# Patient Record
Sex: FEMALE | Race: WHITE | URBAN - METROPOLITAN AREA
[De-identification: names, ages, dates, MRNs, and addresses within clinical notes are randomized per-mention and may not be internally consistent; named-entity substitution may affect disease eponyms.]

---

## 2020-07-08 ENCOUNTER — OFFICE VISIT (OUTPATIENT)
Dept: URBAN - METROPOLITAN AREA CLINIC 98 | Facility: CLINIC | Age: 50
End: 2020-07-08

## 2020-08-03 ENCOUNTER — OFFICE VISIT (OUTPATIENT)
Dept: URBAN - METROPOLITAN AREA CLINIC 98 | Facility: CLINIC | Age: 50
End: 2020-08-03

## 2020-08-31 ENCOUNTER — OFFICE VISIT (OUTPATIENT)
Dept: URBAN - METROPOLITAN AREA CLINIC 98 | Facility: CLINIC | Age: 50
End: 2020-08-31

## 2020-09-23 ENCOUNTER — OFFICE VISIT (OUTPATIENT)
Dept: URBAN - METROPOLITAN AREA CLINIC 98 | Facility: CLINIC | Age: 50
End: 2020-09-23

## 2020-10-21 ENCOUNTER — OFFICE VISIT (OUTPATIENT)
Dept: URBAN - METROPOLITAN AREA CLINIC 98 | Facility: CLINIC | Age: 50
End: 2020-10-21

## 2020-11-18 ENCOUNTER — OFFICE VISIT (OUTPATIENT)
Dept: URBAN - METROPOLITAN AREA CLINIC 98 | Facility: CLINIC | Age: 50
End: 2020-11-18

## 2020-12-16 ENCOUNTER — OFFICE VISIT (OUTPATIENT)
Dept: URBAN - METROPOLITAN AREA CLINIC 98 | Facility: CLINIC | Age: 50
End: 2020-12-16

## 2021-01-13 ENCOUNTER — OFFICE VISIT (OUTPATIENT)
Dept: URBAN - METROPOLITAN AREA CLINIC 98 | Facility: CLINIC | Age: 51
End: 2021-01-13

## 2021-02-11 ENCOUNTER — OFFICE VISIT (OUTPATIENT)
Dept: URBAN - METROPOLITAN AREA CLINIC 98 | Facility: CLINIC | Age: 51
End: 2021-02-11

## 2021-05-06 ENCOUNTER — OFFICE VISIT (OUTPATIENT)
Dept: URBAN - METROPOLITAN AREA CLINIC 98 | Facility: CLINIC | Age: 51
End: 2021-05-06

## 2021-06-02 ENCOUNTER — OFFICE VISIT (OUTPATIENT)
Dept: URBAN - METROPOLITAN AREA CLINIC 98 | Facility: CLINIC | Age: 51
End: 2021-06-02

## 2021-07-01 ENCOUNTER — OFFICE VISIT (OUTPATIENT)
Dept: URBAN - METROPOLITAN AREA CLINIC 98 | Facility: CLINIC | Age: 51
End: 2021-07-01

## 2021-07-28 ENCOUNTER — OFFICE VISIT (OUTPATIENT)
Dept: URBAN - METROPOLITAN AREA CLINIC 98 | Facility: CLINIC | Age: 51
End: 2021-07-28

## 2021-08-30 ENCOUNTER — OFFICE VISIT (OUTPATIENT)
Dept: URBAN - METROPOLITAN AREA CLINIC 98 | Facility: CLINIC | Age: 51
End: 2021-08-30

## 2021-09-23 ENCOUNTER — OFFICE VISIT (OUTPATIENT)
Dept: URBAN - METROPOLITAN AREA CLINIC 98 | Facility: CLINIC | Age: 51
End: 2021-09-23

## 2021-10-21 ENCOUNTER — OFFICE VISIT (OUTPATIENT)
Dept: URBAN - METROPOLITAN AREA CLINIC 98 | Facility: CLINIC | Age: 51
End: 2021-10-21

## 2021-11-18 ENCOUNTER — OFFICE VISIT (OUTPATIENT)
Dept: URBAN - METROPOLITAN AREA CLINIC 98 | Facility: CLINIC | Age: 51
End: 2021-11-18

## 2021-12-20 ENCOUNTER — OFFICE VISIT (OUTPATIENT)
Dept: URBAN - METROPOLITAN AREA CLINIC 98 | Facility: CLINIC | Age: 51
End: 2021-12-20

## 2021-12-22 ENCOUNTER — TELEPHONE ENCOUNTER (OUTPATIENT)
Dept: URBAN - METROPOLITAN AREA CLINIC 98 | Facility: CLINIC | Age: 51
End: 2021-12-22

## 2021-12-22 ENCOUNTER — LAB OUTSIDE AN ENCOUNTER (OUTPATIENT)
Dept: URBAN - METROPOLITAN AREA CLINIC 98 | Facility: CLINIC | Age: 51
End: 2021-12-22

## 2022-01-13 ENCOUNTER — OFFICE VISIT (OUTPATIENT)
Dept: URBAN - METROPOLITAN AREA CLINIC 98 | Facility: CLINIC | Age: 52
End: 2022-01-13

## 2022-01-26 ENCOUNTER — OFFICE VISIT (OUTPATIENT)
Dept: URBAN - METROPOLITAN AREA CLINIC 98 | Facility: CLINIC | Age: 52
End: 2022-01-26

## 2022-02-16 ENCOUNTER — OFFICE VISIT (OUTPATIENT)
Dept: URBAN - METROPOLITAN AREA CLINIC 98 | Facility: CLINIC | Age: 52
End: 2022-02-16

## 2022-02-24 ENCOUNTER — OFFICE VISIT (OUTPATIENT)
Dept: URBAN - METROPOLITAN AREA CLINIC 98 | Facility: CLINIC | Age: 52
End: 2022-02-24

## 2022-03-08 ENCOUNTER — OFFICE VISIT (OUTPATIENT)
Dept: URBAN - METROPOLITAN AREA SURGERY CENTER 18 | Facility: SURGERY CENTER | Age: 52
End: 2022-03-08
Payer: COMMERCIAL

## 2022-03-08 ENCOUNTER — CLAIMS CREATED FROM THE CLAIM WINDOW (OUTPATIENT)
Dept: URBAN - METROPOLITAN AREA CLINIC 4 | Facility: CLINIC | Age: 52
End: 2022-03-08
Payer: COMMERCIAL

## 2022-03-08 DIAGNOSIS — K51.80 OTHER ULCERATIVE COLITIS WITHOUT COMPLICATIONS: ICD-10-CM

## 2022-03-08 DIAGNOSIS — K51.00 ACUTE ULCERATIVE PANCOLITIS: ICD-10-CM

## 2022-03-08 DIAGNOSIS — K63.89 GASEOUS DISTENTION OF INTESTINE DETERMINED BY X-RAY: ICD-10-CM

## 2022-03-08 DIAGNOSIS — Z12.11 AVERAGE RISK FOR CRC. DUE TO PT'S CO-MORBID STATE WITH END STAGE DEMENTIA, HIGH RISK FOR ANESTHESIA (PER NEUROLOGY); INABILITY TO TAKE A BOWEL PREP....WOULD NOT ADVISE ANY COLORECTAL CANCER SCREENING INCLUDING STOOL TEST FOR FECAL BLOOD.: ICD-10-CM

## 2022-03-08 PROCEDURE — 992 NON-BILLABLE: Performed by: INTERNAL MEDICINE

## 2022-03-08 PROCEDURE — G8907 PT DOC NO EVENTS ON DISCHARG: HCPCS | Performed by: INTERNAL MEDICINE

## 2022-03-08 PROCEDURE — 45380 COLONOSCOPY AND BIOPSY: CPT | Performed by: INTERNAL MEDICINE

## 2022-03-08 PROCEDURE — 88305 TISSUE EXAM BY PATHOLOGIST: CPT | Performed by: PATHOLOGY

## 2022-03-10 ENCOUNTER — OFFICE VISIT (OUTPATIENT)
Dept: URBAN - METROPOLITAN AREA CLINIC 98 | Facility: CLINIC | Age: 52
End: 2022-03-10

## 2022-06-02 ENCOUNTER — OFFICE VISIT (OUTPATIENT)
Dept: URBAN - METROPOLITAN AREA CLINIC 98 | Facility: CLINIC | Age: 52
End: 2022-06-02

## 2022-06-16 ENCOUNTER — OFFICE VISIT (OUTPATIENT)
Dept: URBAN - METROPOLITAN AREA CLINIC 98 | Facility: CLINIC | Age: 52
End: 2022-06-16

## 2022-07-18 ENCOUNTER — TELEPHONE ENCOUNTER (OUTPATIENT)
Dept: URBAN - METROPOLITAN AREA CLINIC 98 | Facility: CLINIC | Age: 52
End: 2022-07-18

## 2022-07-18 ENCOUNTER — OFFICE VISIT (OUTPATIENT)
Dept: URBAN - METROPOLITAN AREA CLINIC 98 | Facility: CLINIC | Age: 52
End: 2022-07-18
Payer: COMMERCIAL

## 2022-07-18 ENCOUNTER — WEB ENCOUNTER (OUTPATIENT)
Dept: URBAN - METROPOLITAN AREA CLINIC 98 | Facility: CLINIC | Age: 52
End: 2022-07-18

## 2022-07-18 VITALS
HEIGHT: 65 IN | HEART RATE: 70 BPM | TEMPERATURE: 97.9 F | DIASTOLIC BLOOD PRESSURE: 75 MMHG | SYSTOLIC BLOOD PRESSURE: 117 MMHG | WEIGHT: 198 LBS | BODY MASS INDEX: 32.99 KG/M2

## 2022-07-18 DIAGNOSIS — K62.5 RECTAL BLEEDING: ICD-10-CM

## 2022-07-18 DIAGNOSIS — M54.50 LOW BACK PAIN, UNSPECIFIED: ICD-10-CM

## 2022-07-18 DIAGNOSIS — G89.29 OTHER CHRONIC PAIN: ICD-10-CM

## 2022-07-18 DIAGNOSIS — K51.00 CHRONIC ULCERATIVE ENTEROCOLITIS WITHOUT COMPLICATION: ICD-10-CM

## 2022-07-18 DIAGNOSIS — R10.9 ABDOMINAL PAIN, UNSPECIFIED ABDOMINAL LOCATION: ICD-10-CM

## 2022-07-18 DIAGNOSIS — R19.7 DIARRHEA, UNSPECIFIED TYPE: ICD-10-CM

## 2022-07-18 PROCEDURE — 99215 OFFICE O/P EST HI 40 MIN: CPT | Performed by: INTERNAL MEDICINE

## 2022-07-18 RX ORDER — BUDESONIDE 9 MG/1
1 TABLET IN THE MORNING TABLET, EXTENDED RELEASE ORAL ONCE A DAY
Qty: 30 TABLETS | Refills: 1 | OUTPATIENT
Start: 2022-07-18 | End: 2022-09-16

## 2022-07-18 RX ORDER — VEDOLIZUMAB 300 MG/5ML
AS DIRECTED INJECTION, POWDER, LYOPHILIZED, FOR SOLUTION INTRAVENOUS
Qty: 300 MILLIGRAMS | Refills: 0 | OUTPATIENT
Start: 2022-07-18 | End: 2022-10-16

## 2022-07-18 NOTE — HPI-TODAY'S VISIT:
52 yo female recently completed the etrolizumab study which ended.  Summer was on this study since 2015 and did fabulous.  The study was prematurely ended due to ????efficacy but it was highly efficacious for her.  Last dose of etrolizumab was March-April and she was asyx then.  Syx recurred in June, 1-2 months after last dose.  Had one episode of flare with diarrhea. She has a complete change in BM and the size of stool. More blood. Stool or tissue or mucous.  Little abdominal pain. Is having low back pain. At initial dx, [id number], had syx like this and worse.  Sulfa allergic and dose not tolerate mesalamine. These agents worsen her UC.  No other medical illnesses.  No recent antibiotics and no h/o C diff.  Probiotics do not help. Tumeric in past did not help.  Tried steroid foam that was a temporary fix. Does not think she can wait a month for a study.   Past treatment at dx 2012 included mesalamine that made her worse - sulfa allergy: asacol. Prednisone: effective but with side effects-- fatigue Past Remicade --- anaphylactic rx with third dose ---- underdosed.  Humira - no benefit Simponi - no benefit

## 2022-07-21 ENCOUNTER — TELEPHONE ENCOUNTER (OUTPATIENT)
Dept: URBAN - METROPOLITAN AREA CLINIC 98 | Facility: CLINIC | Age: 52
End: 2022-07-21

## 2022-07-21 LAB
A/G RATIO: 2
ALBUMIN: 4.3
ALKALINE PHOSPHATASE: 78
ALT (SGPT): 15
AST (SGOT): 15
BASO (ABSOLUTE): 0
BASOS: 1
BILIRUBIN, TOTAL: 0.6
BUN/CREATININE RATIO: 14
BUN: 13
C-REACTIVE PROTEIN, QUANT: 8
CALCIUM: 9.5
CARBON DIOXIDE, TOTAL: 25
CHLORIDE: 103
CREATININE: 0.91
EGFR: 77
EOS (ABSOLUTE): 0.2
EOS: 4
FERRITIN, SERUM: 107
FOLATE (FOLIC ACID), SERUM: 5.7
GLOBULIN, TOTAL: 2.1
GLUCOSE: 91
HBSAG SCREEN: NEGATIVE
HEMATOCRIT: 42.3
HEMATOLOGY COMMENTS:: (no result)
HEMOGLOBIN: 14.1
HEP B SURFACE AB, QUAL: REACTIVE
IMMATURE CELLS: (no result)
IMMATURE GRANS (ABS): 0
IMMATURE GRANULOCYTES: 0
IRON BIND.CAP.(TIBC): 321
IRON SATURATION: 26
IRON: 82
LYMPHS (ABSOLUTE): 1.9
LYMPHS: 30
MCH: 31
MCHC: 33.3
MCV: 93
MONOCYTES(ABSOLUTE): 0.5
MONOCYTES: 8
NEUTROPHILS (ABSOLUTE): 3.7
NEUTROPHILS: 57
NRBC: (no result)
PLATELETS: 324
POTASSIUM: 4.3
PROTEIN, TOTAL: 6.4
QUANTIFERON CRITERIA: (no result)
QUANTIFERON INCUBATION: (no result)
QUANTIFERON MITOGEN VALUE: >10
QUANTIFERON NIL VALUE: 0
QUANTIFERON TB1 AG VALUE: 0
QUANTIFERON TB2 AG VALUE: 0
QUANTIFERON-TB GOLD PLUS: NEGATIVE
RBC: 4.55
RDW: 12.4
SEDIMENTATION RATE-WESTERGREN: 4
SODIUM: 140
UIBC: 239
VITAMIN B12: 569
VITAMIN D, 25-HYDROXY: 23.4
WBC: 6.4

## 2022-07-21 RX ORDER — ERGOCALCIFEROL CAPSULES, 1.25 MG/1
1 CAPSULE CAPSULE ORAL
Qty: 12 CAPSULES | Refills: 1 | OUTPATIENT
Start: 2022-07-21 | End: 2023-01-16

## 2022-07-25 ENCOUNTER — TELEPHONE ENCOUNTER (OUTPATIENT)
Dept: URBAN - METROPOLITAN AREA CLINIC 98 | Facility: CLINIC | Age: 52
End: 2022-07-25

## 2022-07-25 ENCOUNTER — LAB OUTSIDE AN ENCOUNTER (OUTPATIENT)
Dept: URBAN - METROPOLITAN AREA CLINIC 98 | Facility: CLINIC | Age: 52
End: 2022-07-25

## 2022-07-28 LAB
C DIFFICILE TOXIN GENE NAA: NEGATIVE
C DIFFICILE TOXINS A+B, EIA: NEGATIVE
CALPROTECTIN, FECAL: 174
LACTOFERRIN, FECAL, QUANT.: 128.8

## 2022-08-03 ENCOUNTER — OFFICE VISIT (OUTPATIENT)
Dept: URBAN - METROPOLITAN AREA CLINIC 97 | Facility: CLINIC | Age: 52
End: 2022-08-03
Payer: COMMERCIAL

## 2022-08-03 VITALS
RESPIRATION RATE: 18 BRPM | WEIGHT: 197 LBS | HEIGHT: 65 IN | HEART RATE: 68 BPM | SYSTOLIC BLOOD PRESSURE: 110 MMHG | TEMPERATURE: 96.5 F | BODY MASS INDEX: 32.82 KG/M2 | DIASTOLIC BLOOD PRESSURE: 71 MMHG

## 2022-08-03 DIAGNOSIS — K51.90 ACUTE ULCERATIVE COLITIS: ICD-10-CM

## 2022-08-03 PROCEDURE — 96413 CHEMO IV INFUSION 1 HR: CPT | Performed by: INTERNAL MEDICINE

## 2022-08-03 RX ORDER — VEDOLIZUMAB 300 MG/5ML
AS DIRECTED INJECTION, POWDER, LYOPHILIZED, FOR SOLUTION INTRAVENOUS
Qty: 300 MILLIGRAMS | Refills: 0 | Status: ACTIVE | COMMUNITY
Start: 2022-07-18 | End: 2022-10-16

## 2022-08-03 RX ORDER — BUDESONIDE 9 MG/1
1 TABLET IN THE MORNING TABLET, EXTENDED RELEASE ORAL ONCE A DAY
Qty: 30 TABLETS | Refills: 1 | Status: ACTIVE | COMMUNITY
Start: 2022-07-18 | End: 2022-09-16

## 2022-08-03 RX ORDER — ERGOCALCIFEROL CAPSULES, 1.25 MG/1
1 CAPSULE CAPSULE ORAL
Qty: 12 CAPSULES | Refills: 1 | Status: ACTIVE | COMMUNITY
Start: 2022-07-21 | End: 2023-01-16

## 2022-08-12 ENCOUNTER — TELEPHONE ENCOUNTER (OUTPATIENT)
Dept: URBAN - METROPOLITAN AREA CLINIC 97 | Facility: CLINIC | Age: 52
End: 2022-08-12

## 2022-08-17 ENCOUNTER — OFFICE VISIT (OUTPATIENT)
Dept: URBAN - METROPOLITAN AREA CLINIC 97 | Facility: CLINIC | Age: 52
End: 2022-08-17
Payer: COMMERCIAL

## 2022-08-17 VITALS
HEIGHT: 65 IN | HEART RATE: 84 BPM | BODY MASS INDEX: 32.82 KG/M2 | RESPIRATION RATE: 18 BRPM | DIASTOLIC BLOOD PRESSURE: 81 MMHG | WEIGHT: 197 LBS | TEMPERATURE: 96 F | SYSTOLIC BLOOD PRESSURE: 131 MMHG

## 2022-08-17 DIAGNOSIS — K51.90 ACUTE ULCERATIVE COLITIS: ICD-10-CM

## 2022-08-17 PROCEDURE — 96413 CHEMO IV INFUSION 1 HR: CPT | Performed by: INTERNAL MEDICINE

## 2022-08-17 RX ORDER — BUDESONIDE 9 MG/1
1 TABLET IN THE MORNING TABLET, EXTENDED RELEASE ORAL ONCE A DAY
Qty: 30 TABLETS | Refills: 1 | Status: ACTIVE | COMMUNITY
Start: 2022-07-18 | End: 2022-09-16

## 2022-08-17 RX ORDER — ERGOCALCIFEROL CAPSULES, 1.25 MG/1
1 CAPSULE CAPSULE ORAL
Qty: 12 CAPSULES | Refills: 1 | Status: ACTIVE | COMMUNITY
Start: 2022-07-21 | End: 2023-01-16

## 2022-08-17 RX ORDER — VEDOLIZUMAB 300 MG/5ML
AS DIRECTED INJECTION, POWDER, LYOPHILIZED, FOR SOLUTION INTRAVENOUS
Qty: 300 MILLIGRAMS | Refills: 0 | Status: ACTIVE | COMMUNITY
Start: 2022-07-18 | End: 2022-10-16

## 2022-08-19 ENCOUNTER — TELEPHONE ENCOUNTER (OUTPATIENT)
Dept: URBAN - METROPOLITAN AREA CLINIC 98 | Facility: CLINIC | Age: 52
End: 2022-08-19

## 2022-08-19 RX ORDER — HYDROCORTISONE ACETATE 1500 MG/15G
1 APPLICATORFUL AEROSOL, FOAM TOPICAL TWICE DAILY
Qty: 60 APPLICATOR | Refills: 5 | OUTPATIENT
Start: 2022-08-19 | End: 2023-02-14

## 2022-08-29 ENCOUNTER — TELEPHONE ENCOUNTER (OUTPATIENT)
Dept: URBAN - METROPOLITAN AREA CLINIC 98 | Facility: CLINIC | Age: 52
End: 2022-08-29

## 2022-08-31 ENCOUNTER — TELEPHONE ENCOUNTER (OUTPATIENT)
Dept: URBAN - METROPOLITAN AREA CLINIC 98 | Facility: CLINIC | Age: 52
End: 2022-08-31

## 2022-08-31 RX ORDER — PRAMOXINE HYDROCHLORIDE HYDROCORTISONE ACETATE 100; 100 MG/10G; MG/10G
1 APPLICATION AEROSOL, FOAM TOPICAL THREE TIMES A DAY
Qty: 90 APPLICATOR | Refills: 5 | OUTPATIENT
Start: 2022-08-31 | End: 2023-02-26

## 2022-09-06 ENCOUNTER — TELEPHONE ENCOUNTER (OUTPATIENT)
Dept: URBAN - METROPOLITAN AREA CLINIC 98 | Facility: CLINIC | Age: 52
End: 2022-09-06

## 2022-09-06 RX ORDER — HYDROCORTISONE ACETATE 1500 MG/15G
1 APPLICATORFUL AEROSOL, FOAM TOPICAL TWICE DAILY
Qty: 180 APPLICATOR | Refills: 2
Start: 2022-08-19 | End: 2023-06-03

## 2022-09-12 ENCOUNTER — OFFICE VISIT (OUTPATIENT)
Dept: URBAN - METROPOLITAN AREA CLINIC 98 | Facility: CLINIC | Age: 52
End: 2022-09-12
Payer: COMMERCIAL

## 2022-09-12 VITALS
HEART RATE: 61 BPM | DIASTOLIC BLOOD PRESSURE: 74 MMHG | BODY MASS INDEX: 32.82 KG/M2 | TEMPERATURE: 97.4 F | WEIGHT: 197 LBS | HEIGHT: 65 IN | SYSTOLIC BLOOD PRESSURE: 121 MMHG

## 2022-09-12 DIAGNOSIS — G89.29 OTHER CHRONIC PAIN: ICD-10-CM

## 2022-09-12 DIAGNOSIS — R19.7 DIARRHEA, UNSPECIFIED TYPE: ICD-10-CM

## 2022-09-12 DIAGNOSIS — K51.00 CHRONIC ULCERATIVE ENTEROCOLITIS WITHOUT COMPLICATION: ICD-10-CM

## 2022-09-12 DIAGNOSIS — R10.9 ABDOMINAL PAIN, UNSPECIFIED ABDOMINAL LOCATION: ICD-10-CM

## 2022-09-12 DIAGNOSIS — K62.5 RECTAL BLEEDING: ICD-10-CM

## 2022-09-12 PROCEDURE — 99214 OFFICE O/P EST MOD 30 MIN: CPT | Performed by: INTERNAL MEDICINE

## 2022-09-12 RX ORDER — ERGOCALCIFEROL CAPSULES, 1.25 MG/1
1 CAPSULE CAPSULE ORAL
Qty: 12 CAPSULES | Refills: 1 | Status: ACTIVE | COMMUNITY
Start: 2022-07-21 | End: 2023-01-16

## 2022-09-12 RX ORDER — HYDROCORTISONE ACETATE 1500 MG/15G
1 APPLICATORFUL AEROSOL, FOAM TOPICAL TWICE DAILY
Qty: 60 APPLICATOR | Refills: 5 | Status: ACTIVE | COMMUNITY
Start: 2022-08-19 | End: 2023-02-14

## 2022-09-12 RX ORDER — VEDOLIZUMAB 300 MG/5ML
AS DIRECTED INJECTION, POWDER, LYOPHILIZED, FOR SOLUTION INTRAVENOUS
Qty: 300 MILLIGRAMS | Refills: 0 | Status: ACTIVE | COMMUNITY
Start: 2022-07-18 | End: 2022-10-16

## 2022-09-12 RX ORDER — BUDESONIDE 9 MG/1
1 TABLET IN THE MORNING TABLET, EXTENDED RELEASE ORAL ONCE A DAY
Qty: 30 TABLETS | Refills: 1 | Status: ACTIVE | COMMUNITY
Start: 2022-07-18 | End: 2022-09-16

## 2022-09-12 NOTE — PHYSICAL EXAM CONSTITUTIONAL:
normal, alert, pleasant, well nourished, in no acute distress, well developed, well nourished, ambulating without difficulty

## 2022-09-12 NOTE — HPI-TODAY'S VISIT:
50 yo female with moderate to severe UC comes in for 2 month f/u. Has had 2 doses of Entyvio, last dose Aug and 3rd dose in 2 days. and is cautiously better. UC is not worsening is how much benefit is she getting from Entyvio vs steroid  Hydrocortisone foam 10% acetate From [employer]. Will try decreasing to qod and d/c if Entyvio really seems to be helping  Rectal bleeding is all gone. Frequency is now 3-4 by day, it is solid stool and smaller size. No pain now. Before foam, had abd pain. Had pancolitis at one time.     ============================ 7/18/22  50 yo female recently completed the etrolizumab study which ended.  Summer was on this study since 2015 and did fabulous.  The study was prematurely ended due to ????efficacy but it was highly efficacious for her.  Last dose of etrolizumab was March-April and she was asyx then.  Syx recurred in June, 1-2 months after last dose.  Had one episode of flare with diarrhea. She has a complete change in BM and the size of stool. More blood. Stool or tissue or mucous.  Little abdominal pain. Is having low back pain. At initial dx, [id number], had syx like this and worse.  Sulfa allergic and dose not tolerate mesalamine. These agents worsen her UC.  No other medical illnesses.  No recent antibiotics and no h/o C diff.  Probiotics do not help. Tumeric in past did not help.  Tried steroid foam that was a temporary fix. Does not think she can wait a month for a study.   Past treatment at dx 2012 included mesalamine that made her worse - sulfa allergy: asacol. Prednisone: effective but with side effects-- fatigue Past Remicade --- anaphylactic rx with third dose ---- underdosed.  Humira - no benefit Simponi - no benefit

## 2022-09-13 ENCOUNTER — OFFICE VISIT (OUTPATIENT)
Dept: URBAN - METROPOLITAN AREA SURGERY CENTER 18 | Facility: SURGERY CENTER | Age: 52
End: 2022-09-13
Payer: COMMERCIAL

## 2022-09-13 ENCOUNTER — CLAIMS CREATED FROM THE CLAIM WINDOW (OUTPATIENT)
Dept: URBAN - METROPOLITAN AREA CLINIC 4 | Facility: CLINIC | Age: 52
End: 2022-09-13
Payer: COMMERCIAL

## 2022-09-13 DIAGNOSIS — K63.89 OTHER SPECIFIED DISEASES OF INTESTINE: ICD-10-CM

## 2022-09-13 DIAGNOSIS — K51.80 OTHER ULCERATIVE COLITIS WITHOUT COMPLICATIONS: ICD-10-CM

## 2022-09-13 DIAGNOSIS — K51.00 ACUTE ULCERATIVE PANCOLITIS: ICD-10-CM

## 2022-09-13 PROCEDURE — G8907 PT DOC NO EVENTS ON DISCHARG: HCPCS | Performed by: INTERNAL MEDICINE

## 2022-09-13 PROCEDURE — 45380 COLONOSCOPY AND BIOPSY: CPT | Performed by: INTERNAL MEDICINE

## 2022-09-13 PROCEDURE — 88305 TISSUE EXAM BY PATHOLOGIST: CPT | Performed by: PATHOLOGY

## 2022-09-13 RX ORDER — BUDESONIDE 9 MG/1
1 TABLET IN THE MORNING TABLET, EXTENDED RELEASE ORAL ONCE A DAY
Qty: 30 TABLETS | Refills: 1 | Status: ACTIVE | COMMUNITY
Start: 2022-07-18 | End: 2022-09-16

## 2022-09-13 RX ORDER — HYDROCORTISONE ACETATE 1500 MG/15G
1 APPLICATORFUL AEROSOL, FOAM TOPICAL TWICE DAILY
Qty: 60 APPLICATOR | Refills: 5 | Status: ACTIVE | COMMUNITY
Start: 2022-08-19 | End: 2023-02-14

## 2022-09-13 RX ORDER — ERGOCALCIFEROL CAPSULES, 1.25 MG/1
1 CAPSULE CAPSULE ORAL
Qty: 12 CAPSULES | Refills: 1 | Status: ACTIVE | COMMUNITY
Start: 2022-07-21 | End: 2023-01-16

## 2022-09-13 RX ORDER — VEDOLIZUMAB 300 MG/5ML
AS DIRECTED INJECTION, POWDER, LYOPHILIZED, FOR SOLUTION INTRAVENOUS
Qty: 300 MILLIGRAMS | Refills: 0 | Status: ACTIVE | COMMUNITY
Start: 2022-07-18 | End: 2022-10-16

## 2022-09-14 ENCOUNTER — OFFICE VISIT (OUTPATIENT)
Dept: URBAN - METROPOLITAN AREA CLINIC 97 | Facility: CLINIC | Age: 52
End: 2022-09-14
Payer: COMMERCIAL

## 2022-09-14 VITALS
WEIGHT: 195 LBS | SYSTOLIC BLOOD PRESSURE: 103 MMHG | TEMPERATURE: 96.7 F | HEART RATE: 75 BPM | RESPIRATION RATE: 17 BRPM | DIASTOLIC BLOOD PRESSURE: 67 MMHG | BODY MASS INDEX: 32.49 KG/M2 | HEIGHT: 65 IN

## 2022-09-14 DIAGNOSIS — K51.90 ACUTE ULCERATIVE COLITIS: ICD-10-CM

## 2022-09-14 PROCEDURE — 96413 CHEMO IV INFUSION 1 HR: CPT | Performed by: INTERNAL MEDICINE

## 2022-09-14 RX ORDER — ERGOCALCIFEROL CAPSULES, 1.25 MG/1
1 CAPSULE CAPSULE ORAL
Qty: 12 CAPSULES | Refills: 1 | Status: ACTIVE | COMMUNITY
Start: 2022-07-21 | End: 2023-01-16

## 2022-09-14 RX ORDER — HYDROCORTISONE ACETATE 1500 MG/15G
1 APPLICATORFUL AEROSOL, FOAM TOPICAL TWICE DAILY
Qty: 60 APPLICATOR | Refills: 5 | Status: ACTIVE | COMMUNITY
Start: 2022-08-19 | End: 2023-02-14

## 2022-09-14 RX ORDER — BUDESONIDE 9 MG/1
1 TABLET IN THE MORNING TABLET, EXTENDED RELEASE ORAL ONCE A DAY
Qty: 30 TABLETS | Refills: 1 | Status: ACTIVE | COMMUNITY
Start: 2022-07-18 | End: 2022-09-16

## 2022-09-14 RX ORDER — VEDOLIZUMAB 300 MG/5ML
AS DIRECTED INJECTION, POWDER, LYOPHILIZED, FOR SOLUTION INTRAVENOUS
Qty: 300 MILLIGRAMS | Refills: 0 | Status: ACTIVE | COMMUNITY
Start: 2022-07-18 | End: 2022-10-16

## 2022-11-09 ENCOUNTER — CLAIMS CREATED FROM THE CLAIM WINDOW (OUTPATIENT)
Dept: URBAN - METROPOLITAN AREA TELEHEALTH 2 | Facility: TELEHEALTH | Age: 52
End: 2022-11-09

## 2022-11-09 ENCOUNTER — OFFICE VISIT (OUTPATIENT)
Dept: URBAN - METROPOLITAN AREA CLINIC 97 | Facility: CLINIC | Age: 52
End: 2022-11-09
Payer: COMMERCIAL

## 2022-11-09 ENCOUNTER — TELEPHONE ENCOUNTER (OUTPATIENT)
Dept: URBAN - METROPOLITAN AREA CLINIC 98 | Facility: CLINIC | Age: 52
End: 2022-11-09

## 2022-11-09 ENCOUNTER — OFFICE VISIT (OUTPATIENT)
Dept: URBAN - METROPOLITAN AREA TELEHEALTH 2 | Facility: TELEHEALTH | Age: 52
End: 2022-11-09

## 2022-11-09 ENCOUNTER — CLAIMS CREATED FROM THE CLAIM WINDOW (OUTPATIENT)
Dept: URBAN - METROPOLITAN AREA TELEHEALTH 2 | Facility: TELEHEALTH | Age: 52
End: 2022-11-09
Payer: COMMERCIAL

## 2022-11-09 VITALS
BODY MASS INDEX: 32.49 KG/M2 | WEIGHT: 195 LBS | HEIGHT: 65 IN | TEMPERATURE: 98.3 F | RESPIRATION RATE: 20 BRPM | SYSTOLIC BLOOD PRESSURE: 139 MMHG | HEART RATE: 69 BPM | DIASTOLIC BLOOD PRESSURE: 85 MMHG

## 2022-11-09 VITALS — HEIGHT: 65 IN | BODY MASS INDEX: 32.49 KG/M2 | WEIGHT: 195 LBS

## 2022-11-09 DIAGNOSIS — K51.00 CHRONIC ULCERATIVE ENTEROCOLITIS WITHOUT COMPLICATION: ICD-10-CM

## 2022-11-09 DIAGNOSIS — R19.7 DIARRHEA, UNSPECIFIED TYPE: ICD-10-CM

## 2022-11-09 DIAGNOSIS — G89.29 OTHER CHRONIC PAIN: ICD-10-CM

## 2022-11-09 DIAGNOSIS — R10.9 ABDOMINAL PAIN, UNSPECIFIED ABDOMINAL LOCATION: ICD-10-CM

## 2022-11-09 DIAGNOSIS — K62.5 RECTAL BLEEDING: ICD-10-CM

## 2022-11-09 DIAGNOSIS — K51.80 CHRONIC PANCOLONIC ULCERATIVE COLITIS: ICD-10-CM

## 2022-11-09 DIAGNOSIS — R10.84 ABDOMINAL CRAMPING, GENERALIZED: ICD-10-CM

## 2022-11-09 PROCEDURE — 99214 OFFICE O/P EST MOD 30 MIN: CPT | Performed by: INTERNAL MEDICINE

## 2022-11-09 PROCEDURE — 96413 CHEMO IV INFUSION 1 HR: CPT | Performed by: INTERNAL MEDICINE

## 2022-11-09 RX ORDER — PREDNISONE 10 MG/1
TAKE 4 TABLETS DAILY WEEK 1, 3 TABS DAILY WEEK 2, 2 TABS DAILY WEEK 3, THEN 1 TAB DAILY WEEK 4, THEN STOP TABLET ORAL ONCE A DAY
Qty: 70 TABLETS | Refills: 0 | OUTPATIENT
Start: 2022-11-09 | End: 2022-12-09

## 2022-11-09 RX ORDER — ERGOCALCIFEROL CAPSULES, 1.25 MG/1
1 CAPSULE CAPSULE ORAL
Qty: 12 CAPSULES | Refills: 1 | Status: ACTIVE | COMMUNITY
Start: 2022-07-21 | End: 2023-01-16

## 2022-11-09 RX ORDER — HYDROCORTISONE ACETATE 1500 MG/15G
1 APPLICATORFUL AEROSOL, FOAM TOPICAL TWICE DAILY
Qty: 60 APPLICATOR | Refills: 5 | Status: ACTIVE | COMMUNITY
Start: 2022-08-19 | End: 2023-02-14

## 2022-11-09 NOTE — HPI-TODAY'S VISIT:
51 yo female with mod-severe UC for urgent TH visit.  Had 4th Entyvio infusion and .......  6 days ago, it was like she was on no medication. Abd pain subsided but got diarrhea. Lots of mucus. Took immodium. Had a flare likely due to UNDETECTABLE ENTYVIO. Urgency and cramping. Still had the rectal foam and has used it to control urgency and nausea. Today had infusion at  and as day has gone on, she got a sore throat and a runny nose flue like syx. No SOB. Fatigue. No itchiness. Lots of itching leading ups to the UC syx recurring. No flushing, but she felt neck was reddish. No tightness.  No acute rx during the infusion.    ========================= 9/12/22  52 yo female with moderate to severe UC comes in for 2 month f/u. Has had 2 doses of Entyvio, last dose Aug and 3rd dose in 2 days. and is cautiously better. UC is not worsening is how much benefit is she getting from Entyvio vs steroid  Hydrocortisone foam 10% acetate From [employer]. Will try decreasing to qod and d/c if Entyvio really seems to be helping  Rectal bleeding is all gone. Frequency is now 3-4 by day, it is solid stool and smaller size. No pain now. Before foam, had abd pain. Had pancolitis at one time.     ============================ 7/18/22  52 yo female recently completed the etrolizumab study which ended.  Summer was on this study since 2015 and did fabulous.  The study was prematurely ended due to ????efficacy but it was highly efficacious for her.  Last dose of etrolizumab was March-April and she was asyx then.  Syx recurred in June, 1-2 months after last dose.  Had one episode of flare with diarrhea. She has a complete change in BM and the size of stool. More blood. Stool or tissue or mucous.  Little abdominal pain. Is having low back pain. At initial dx, [id number], had syx like this and worse.  Sulfa allergic and dose not tolerate mesalamine. These agents worsen her UC.  No other medical illnesses.  No recent antibiotics and no h/o C diff.  Probiotics do not help. Tumeric in past did not help.  Tried steroid foam that was a temporary fix. Does not think she can wait a month for a study.   Past treatment at dx 2012 included mesalamine that made her worse - sulfa allergy: asacol. Prednisone: effective but with side effects-- fatigue Past Remicade --- anaphylactic rx with third dose ---- underdosed.  Humira - no benefit Simponi - no benefit

## 2022-11-17 ENCOUNTER — TELEPHONE ENCOUNTER (OUTPATIENT)
Dept: URBAN - METROPOLITAN AREA CLINIC 97 | Facility: CLINIC | Age: 52
End: 2022-11-17

## 2022-12-06 ENCOUNTER — WEB ENCOUNTER (OUTPATIENT)
Dept: URBAN - METROPOLITAN AREA CLINIC 98 | Facility: CLINIC | Age: 52
End: 2022-12-06

## 2023-01-09 ENCOUNTER — OFFICE VISIT (OUTPATIENT)
Dept: URBAN - METROPOLITAN AREA CLINIC 98 | Facility: CLINIC | Age: 53
End: 2023-01-09
Payer: COMMERCIAL

## 2023-01-09 VITALS — BODY MASS INDEX: 32.49 KG/M2 | HEIGHT: 65 IN | WEIGHT: 195 LBS

## 2023-01-09 DIAGNOSIS — R10.9 ABDOMINAL PAIN, UNSPECIFIED ABDOMINAL LOCATION: ICD-10-CM

## 2023-01-09 DIAGNOSIS — K62.5 RECTAL BLEEDING: ICD-10-CM

## 2023-01-09 DIAGNOSIS — G89.29 OTHER CHRONIC PAIN: ICD-10-CM

## 2023-01-09 DIAGNOSIS — R19.7 DIARRHEA, UNSPECIFIED TYPE: ICD-10-CM

## 2023-01-09 DIAGNOSIS — K51.00 CHRONIC ULCERATIVE ENTEROCOLITIS WITHOUT COMPLICATION: ICD-10-CM

## 2023-01-09 PROCEDURE — 99215 OFFICE O/P EST HI 40 MIN: CPT | Performed by: INTERNAL MEDICINE

## 2023-01-09 RX ORDER — HYDROCORTISONE ACETATE 1500 MG/15G
1 APPLICATORFUL AEROSOL, FOAM TOPICAL TWICE DAILY
Qty: 60 APPLICATOR | Refills: 5 | Status: ACTIVE | COMMUNITY
Start: 2022-08-19 | End: 2023-02-14

## 2023-01-09 RX ORDER — ERGOCALCIFEROL CAPSULES, 1.25 MG/1
1 CAPSULE CAPSULE ORAL
Qty: 12 CAPSULES | Refills: 1 | Status: ACTIVE | COMMUNITY
Start: 2022-07-21 | End: 2023-01-16

## 2023-01-09 NOTE — PHYSICAL EXAM RECTAL:
normal tone, no external hemorrhoids, no masses palpable, no red blood, Tenderness on PHUC, Internal hemorrhoids present

## 2023-01-09 NOTE — HPI-TODAY'S VISIT:
51 yo female with severe UC had last dose of Etrolizumab March-April and had first dose Entyvio on August 3 and did fine until 1-2 weeks before dose number 4, an 8 week interval, and developed increased GI syx.  Had infusion on 11/9 and developed runny nose, sneezing, itching.  Has had lot of itching now..  No real colitis syx, except Vance Franklin had mucus. Had a tinge of blood.  Today is 8.5 weeks since last infusion. 5 week [facility] trough VDZ=21.1 so 8 week likely 10 or so, and likely want higher Current syx No runny nose. Had itching 12/29 and it kept her awake.  UC syx are controlled and inactive. Mucus in stool and no blood. Prior to last dose on 11/9, had lots of GI including: urgency blood 10 or more stools a day.     ====================== 11/9/22  51 yo female with mod-severe UC for urgent TH visit.  Had 4th Entyvio infusion and .......  6 days ago, it was like she was on no medication. Abd pain subsided but got diarrhea. Lots of mucus. Took immodium. Had a flare likely due to UNDETECTABLE ENTYVIO. Urgency and cramping. Still had the rectal foam and has used it to control urgency and nausea. Today had infusion at  and as day has gone on, she got a sore throat and a runny nose flue like syx. No SOB. Fatigue. No itchiness. Lots of itching leading ups to the UC syx recurring. No flushing, but she felt neck was reddish. No tightness.  No acute rx during the infusion.    ========================= 9/12/22  50 yo female with moderate to severe UC comes in for 2 month f/u. Has had 2 doses of Entyvio, last dose Aug and 3rd dose in 2 days. and is cautiously better. UC is not worsening is how much benefit is she getting from Entyvio vs steroid  Hydrocortisone foam 10% acetate From [employer]. Will try decreasing to qod and d/c if Entyvio really seems to be helping  Rectal bleeding is all gone. Frequency is now 3-4 by day, it is solid stool and smaller size. No pain now. Before foam, had abd pain. Had pancolitis at one time.     ============================ 7/18/22  50 yo female recently completed the etrolizumab study which ended.  Summer was on this study since 2015 and did fabulous.  The study was prematurely ended due to ????efficacy but it was highly efficacious for her.  Last dose of etrolizumab was March-April and she was asyx then.  Syx recurred in June, 1-2 months after last dose.  Had one episode of flare with diarrhea. She has a complete change in BM and the size of stool. More blood. Stool or tissue or mucous.  Little abdominal pain. Is having low back pain. At initial dx, [id number], had syx like this and worse.  Sulfa allergic and dose not tolerate mesalamine. These agents worsen her UC.  No other medical illnesses.  No recent antibiotics and no h/o C diff.  Probiotics do not help. Tumeric in past did not help.  Tried steroid foam that was a temporary fix. Does not think she can wait a month for a study.   Past treatment at dx 2012 included mesalamine that made her worse - sulfa allergy: asacol. Prednisone: effective but with side effects-- fatigue Past Remicade --- anaphylactic rx with third dose ---- underdosed.  Humira - no benefit Simponi - no benefit

## 2023-01-17 LAB
A/G RATIO: 1.9
ALBUMIN: 4.3
ALKALINE PHOSPHATASE: 76
ALT (SGPT): 13
ANTI-VEDOLIZUMAB ANTIBODY: <25
AST (SGOT): 13
BASO (ABSOLUTE): 0.1
BASOS: 1
BILIRUBIN, TOTAL: 0.4
BUN/CREATININE RATIO: 18
BUN: 16
C-REACTIVE PROTEIN, QUANT: 4
CALCIUM: 10
CARBON DIOXIDE, TOTAL: 24
CHLORIDE: 103
CREATININE: 0.89
EGFR: 78
EOS (ABSOLUTE): 0.3
EOS: 4
FERRITIN, SERUM: 84
FOLATE (FOLIC ACID), SERUM: 6.5
GLOBULIN, TOTAL: 2.3
GLUCOSE: 89
HEMATOCRIT: 43.3
HEMATOLOGY COMMENTS:: (no result)
HEMOGLOBIN: 14.6
IMMATURE CELLS: (no result)
IMMATURE GRANS (ABS): 0
IMMATURE GRANULOCYTES: 0
IRON BIND.CAP.(TIBC): 328
IRON SATURATION: 20
IRON: 65
LYMPHS (ABSOLUTE): 3
LYMPHS: 40
Lab: (no result)
MCH: 31.5
MCHC: 33.7
MCV: 94
MONOCYTES(ABSOLUTE): 0.5
MONOCYTES: 7
NEUTROPHILS (ABSOLUTE): 3.6
NEUTROPHILS: 48
NRBC: (no result)
PLATELETS: 350
POTASSIUM: 4.4
PROTEIN, TOTAL: 6.6
RBC: 4.63
RDW: 12.4
SEDIMENTATION RATE-WESTERGREN: 2
SODIUM: 141
UIBC: 263
VEDOLIZUMAB: 6.7
VITAMIN B12: 579
WBC: 7.5

## 2023-01-24 ENCOUNTER — OFFICE VISIT (OUTPATIENT)
Dept: URBAN - METROPOLITAN AREA CLINIC 97 | Facility: CLINIC | Age: 53
End: 2023-01-24
Payer: COMMERCIAL

## 2023-01-24 VITALS
RESPIRATION RATE: 17 BRPM | SYSTOLIC BLOOD PRESSURE: 122 MMHG | HEART RATE: 94 BPM | DIASTOLIC BLOOD PRESSURE: 76 MMHG | BODY MASS INDEX: 33.15 KG/M2 | TEMPERATURE: 96 F | WEIGHT: 199 LBS | HEIGHT: 65 IN

## 2023-01-24 DIAGNOSIS — K51.80 CHRONIC PANCOLONIC ULCERATIVE COLITIS: ICD-10-CM

## 2023-01-24 PROCEDURE — 96413 CHEMO IV INFUSION 1 HR: CPT | Performed by: INTERNAL MEDICINE

## 2023-01-24 RX ORDER — HYDROCORTISONE ACETATE 1500 MG/15G
1 APPLICATORFUL AEROSOL, FOAM TOPICAL TWICE DAILY
Qty: 60 APPLICATOR | Refills: 5 | Status: ACTIVE | COMMUNITY
Start: 2022-08-19 | End: 2023-02-14

## 2023-02-15 ENCOUNTER — OFFICE VISIT (OUTPATIENT)
Dept: URBAN - METROPOLITAN AREA CLINIC 98 | Facility: CLINIC | Age: 53
End: 2023-02-15
Payer: COMMERCIAL

## 2023-02-15 VITALS
SYSTOLIC BLOOD PRESSURE: 128 MMHG | HEIGHT: 65 IN | BODY MASS INDEX: 33.49 KG/M2 | DIASTOLIC BLOOD PRESSURE: 83 MMHG | HEART RATE: 73 BPM | WEIGHT: 201 LBS | TEMPERATURE: 97.2 F

## 2023-02-15 DIAGNOSIS — R19.7 DIARRHEA, UNSPECIFIED TYPE: ICD-10-CM

## 2023-02-15 DIAGNOSIS — R10.9 ABDOMINAL PAIN, UNSPECIFIED ABDOMINAL LOCATION: ICD-10-CM

## 2023-02-15 DIAGNOSIS — G89.29 OTHER CHRONIC PAIN: ICD-10-CM

## 2023-02-15 DIAGNOSIS — K51.00 CHRONIC ULCERATIVE ENTEROCOLITIS WITHOUT COMPLICATION: ICD-10-CM

## 2023-02-15 DIAGNOSIS — K62.5 RECTAL BLEEDING: ICD-10-CM

## 2023-02-15 PROBLEM — 62315008: Status: ACTIVE | Noted: 2022-07-18

## 2023-02-15 PROBLEM — 82423001: Status: ACTIVE | Noted: 2022-07-18

## 2023-02-15 PROBLEM — 12063002 RECTAL BLEEDING: Status: ACTIVE | Noted: 2022-07-18

## 2023-02-15 PROBLEM — 21522001: Status: ACTIVE | Noted: 2022-07-18

## 2023-02-15 PROCEDURE — 99215 OFFICE O/P EST HI 40 MIN: CPT | Performed by: INTERNAL MEDICINE

## 2023-02-15 RX ORDER — VEDOLIZUMAB 300 MG/5ML
AS DIRECTED INJECTION, POWDER, LYOPHILIZED, FOR SOLUTION INTRAVENOUS
Qty: 300 | OUTPATIENT
Start: 2023-02-15 | End: 2023-05-16

## 2023-02-15 NOTE — HPI-TODAY'S VISIT:
51 yo female with 4 week f/u and  had entyvio 1/24 and trough labs - 9 weeks was 10.3 and no antibodies. Took peridose steroids and had no issues. I pre-treated and did extra labs becasue the itching was a potential manifestation of an infusion reaction.   Her more aggressive Crohn's diesase requires higher dosing of biologic especially with multiple past biologic failures. Etrolizumab was dosed ever 4 weeks. Few or no gi syx pre 9 week dose of Entyvio. Had no itching after Entyvio this time. previous infusios were 8 weeks.  So she needs more Entyvio at this point as a trough level of 10 at 8.5 weeks, though [facility] draw due to scheduling was maybe 10 weeks.   No antibodies.  I recommend shortening interval slightly for better outcomes.  Need to Rx q 6 weeks dosing and continue to monitor with labs and TDM.  It is confusing or confounding that her [facility] Ent level on 1/9 was 6.7 and no antibodies and her Ent/vedo level from [employer] on 1/20/23 was 10.3 and no antibodies, 10 days later was 50% higher vedo level and so "which assay to believe".  Will add trough stool biomarkers to next pre- dose and repeat Lab cristin tdm and labs in 4-5 months. ========================================== 1/20/23  51 yo female with severe UC had last dose of Etrolizumab March-April and had first dose Entyvio on August 3 and did fine until 1-2 weeks before dose number 4, an 8 week interval, and developed increased GI syx.  Had infusion on 11/9 and developed runny nose, sneezing, itching.  Has had lot of itching now..  No real colitis syx, except [place] Noemi had mucus. Had a tinge of blood.  Today is 8.5 weeks since last infusion. 5 week [facility] trough VDZ=21.1 so 8 week likely 10 or so, and likely want higher Current syx No runny nose. Had itching 12/29 and it kept her awake.  UC syx are controlled and inactive. Mucus in stool and no blood. Prior to last dose on 11/9, had lots of GI including: urgency blood 10 or more stools a day.     ====================== 11/9/22  51 yo female with mod-severe UC for urgent TH visit.  Had 4th Entyvio infusion and .......  6 days ago, it was like she was on no medication. Abd pain subsided but got diarrhea. Lots of mucus. Took immodium. Had a flare likely due to UNDETECTABLE ENTYVIO. Urgency and cramping. Still had the rectal foam and has used it to control urgency and nausea. Today had infusion at  and as day has gone on, she got a sore throat and a runny nose flue like syx. No SOB. Fatigue. No itchiness. Lots of itching leading ups to the UC syx recurring. No flushing, but she felt neck was reddish. No tightness.  No acute rx during the infusion.    ========================= 9/12/22  50 yo female with moderate to severe UC comes in for 2 month f/u. Has had 2 doses of Entyvio, last dose Aug and 3rd dose in 2 days. and is cautiously better. UC is not worsening is how much benefit is she getting from Entyvio vs steroid  Hydrocortisone foam 10% acetate From [employer]. Will try decreasing to qod and d/c if Entyvio really seems to be helping  Rectal bleeding is all gone. Frequency is now 3-4 by day, it is solid stool and smaller size. No pain now. Before foam, had abd pain. Had pancolitis at one time.     ============================ 7/18/22  50 yo female recently completed the etrolizumab study which ended.  Summer was on this study since 2015 and did fabulous.  The study was prematurely ended due to ????efficacy but it was highly efficacious for her.  Last dose of etrolizumab was March-April and she was asyx then.  Syx recurred in June, 1-2 months after last dose.  Had one episode of flare with diarrhea. She has a complete change in BM and the size of stool. More blood. Stool or tissue or mucous.  Little abdominal pain. Is having low back pain. At initial dx, [id number], had syx like this and worse.  Sulfa allergic and dose not tolerate mesalamine. These agents worsen her UC.  No other medical illnesses.  No recent antibiotics and no h/o C diff.  Probiotics do not help. Tumeric in past did not help.  Tried steroid foam that was a temporary fix. Does not think she can wait a month for a study.   Past treatment at dx 2012 included mesalamine that made her worse - sulfa allergy: asacol. Prednisone: effective but with side effects-- fatigue Past Remicade --- anaphylactic rx with third dose ---- underdosed.  Humira - no benefit Simponi - no benefit

## 2023-02-16 PROBLEM — 15342002: Status: ACTIVE | Noted: 2021-12-22

## 2023-03-07 ENCOUNTER — OFFICE VISIT (OUTPATIENT)
Dept: URBAN - METROPOLITAN AREA CLINIC 97 | Facility: CLINIC | Age: 53
End: 2023-03-07
Payer: COMMERCIAL

## 2023-03-07 VITALS
HEIGHT: 65 IN | WEIGHT: 201 LBS | DIASTOLIC BLOOD PRESSURE: 98 MMHG | HEART RATE: 74 BPM | RESPIRATION RATE: 16 BRPM | BODY MASS INDEX: 33.49 KG/M2 | TEMPERATURE: 97.2 F | SYSTOLIC BLOOD PRESSURE: 158 MMHG

## 2023-03-07 DIAGNOSIS — K51.80 OTHER ULCERATIVE COLITIS: ICD-10-CM

## 2023-03-07 PROCEDURE — 96413 CHEMO IV INFUSION 1 HR: CPT | Performed by: INTERNAL MEDICINE

## 2023-03-07 RX ORDER — VEDOLIZUMAB 300 MG/5ML
AS DIRECTED INJECTION, POWDER, LYOPHILIZED, FOR SOLUTION INTRAVENOUS
Qty: 300 | Status: ACTIVE | COMMUNITY
Start: 2023-02-15 | End: 2023-05-16

## 2023-03-08 PROBLEM — 64766004 ULCERATIVE COLITIS: Status: ACTIVE | Noted: 2023-03-08

## 2023-03-21 ENCOUNTER — OFFICE VISIT (OUTPATIENT)
Dept: URBAN - METROPOLITAN AREA CLINIC 97 | Facility: CLINIC | Age: 53
End: 2023-03-21

## 2023-04-18 ENCOUNTER — OFFICE VISIT (OUTPATIENT)
Dept: URBAN - METROPOLITAN AREA CLINIC 97 | Facility: CLINIC | Age: 53
End: 2023-04-18
Payer: COMMERCIAL

## 2023-04-18 VITALS
HEART RATE: 66 BPM | DIASTOLIC BLOOD PRESSURE: 77 MMHG | HEIGHT: 65 IN | BODY MASS INDEX: 33.66 KG/M2 | TEMPERATURE: 97.9 F | WEIGHT: 202 LBS | SYSTOLIC BLOOD PRESSURE: 108 MMHG | RESPIRATION RATE: 18 BRPM

## 2023-04-18 DIAGNOSIS — K51.80 OTHER ULCERATIVE COLITIS: ICD-10-CM

## 2023-04-18 PROCEDURE — 96413 CHEMO IV INFUSION 1 HR: CPT | Performed by: INTERNAL MEDICINE

## 2023-04-18 RX ORDER — VEDOLIZUMAB 300 MG/5ML
AS DIRECTED INJECTION, POWDER, LYOPHILIZED, FOR SOLUTION INTRAVENOUS
Qty: 300 | Status: ACTIVE | COMMUNITY
Start: 2023-02-15 | End: 2023-05-16

## 2023-05-30 ENCOUNTER — OFFICE VISIT (OUTPATIENT)
Dept: URBAN - METROPOLITAN AREA CLINIC 97 | Facility: CLINIC | Age: 53
End: 2023-05-30
Payer: COMMERCIAL

## 2023-05-30 VITALS
SYSTOLIC BLOOD PRESSURE: 125 MMHG | HEIGHT: 65 IN | TEMPERATURE: 97.9 F | BODY MASS INDEX: 33.66 KG/M2 | DIASTOLIC BLOOD PRESSURE: 71 MMHG | HEART RATE: 72 BPM | WEIGHT: 202 LBS | RESPIRATION RATE: 18 BRPM

## 2023-05-30 DIAGNOSIS — K51.80 CHRONIC PANCOLONIC ULCERATIVE COLITIS: ICD-10-CM

## 2023-05-30 PROCEDURE — 96413 CHEMO IV INFUSION 1 HR: CPT | Performed by: INTERNAL MEDICINE

## 2023-07-11 ENCOUNTER — OFFICE VISIT (OUTPATIENT)
Dept: URBAN - METROPOLITAN AREA CLINIC 97 | Facility: CLINIC | Age: 53
End: 2023-07-11
Payer: COMMERCIAL

## 2023-07-11 VITALS
DIASTOLIC BLOOD PRESSURE: 73 MMHG | BODY MASS INDEX: 33.49 KG/M2 | HEIGHT: 65 IN | RESPIRATION RATE: 18 BRPM | SYSTOLIC BLOOD PRESSURE: 127 MMHG | TEMPERATURE: 97.6 F | HEART RATE: 86 BPM | WEIGHT: 201 LBS

## 2023-07-11 DIAGNOSIS — K51.80 OTHER ULCERATIVE COLITIS: ICD-10-CM

## 2023-07-11 PROCEDURE — 96413 CHEMO IV INFUSION 1 HR: CPT | Performed by: INTERNAL MEDICINE

## 2023-07-17 LAB
CALPROTECTIN, FECAL: 5
LACTOFERRIN, FECAL, QUANT.: 1.08

## 2023-08-24 ENCOUNTER — WEB ENCOUNTER (OUTPATIENT)
Dept: URBAN - METROPOLITAN AREA CLINIC 98 | Facility: CLINIC | Age: 53
End: 2023-08-24

## 2023-08-28 ENCOUNTER — OFFICE VISIT (OUTPATIENT)
Dept: URBAN - METROPOLITAN AREA CLINIC 97 | Facility: CLINIC | Age: 53
End: 2023-08-28
Payer: COMMERCIAL

## 2023-08-28 VITALS
WEIGHT: 202 LBS | RESPIRATION RATE: 18 BRPM | BODY MASS INDEX: 33.66 KG/M2 | HEIGHT: 65 IN | HEART RATE: 65 BPM | SYSTOLIC BLOOD PRESSURE: 130 MMHG | TEMPERATURE: 97.7 F | DIASTOLIC BLOOD PRESSURE: 76 MMHG

## 2023-08-28 DIAGNOSIS — K51.80 CHRONIC PANCOLONIC ULCERATIVE COLITIS: ICD-10-CM

## 2023-08-28 PROCEDURE — 96413 CHEMO IV INFUSION 1 HR: CPT | Performed by: INTERNAL MEDICINE

## 2023-09-29 ENCOUNTER — WEB ENCOUNTER (OUTPATIENT)
Dept: URBAN - METROPOLITAN AREA CLINIC 98 | Facility: CLINIC | Age: 53
End: 2023-09-29

## 2023-10-02 ENCOUNTER — TELEPHONE ENCOUNTER (OUTPATIENT)
Dept: URBAN - METROPOLITAN AREA CLINIC 98 | Facility: CLINIC | Age: 53
End: 2023-10-02

## 2023-10-09 ENCOUNTER — OFFICE VISIT (OUTPATIENT)
Dept: URBAN - METROPOLITAN AREA CLINIC 97 | Facility: CLINIC | Age: 53
End: 2023-10-09
Payer: COMMERCIAL

## 2023-10-09 VITALS
BODY MASS INDEX: 33.79 KG/M2 | HEART RATE: 70 BPM | HEIGHT: 65 IN | SYSTOLIC BLOOD PRESSURE: 113 MMHG | DIASTOLIC BLOOD PRESSURE: 69 MMHG | RESPIRATION RATE: 18 BRPM | TEMPERATURE: 97.5 F | WEIGHT: 202.8 LBS

## 2023-10-09 DIAGNOSIS — K51.80 OTHER ULCERATIVE COLITIS: ICD-10-CM

## 2023-10-09 PROCEDURE — 96413 CHEMO IV INFUSION 1 HR: CPT | Performed by: INTERNAL MEDICINE

## 2023-10-13 ENCOUNTER — TELEPHONE ENCOUNTER (OUTPATIENT)
Dept: URBAN - METROPOLITAN AREA CLINIC 98 | Facility: CLINIC | Age: 53
End: 2023-10-13

## 2023-10-13 RX ORDER — ERGOCALCIFEROL CAPSULES, 1.25 MG/1
1 CAPSULE CAPSULE ORAL
Qty: 12 CAPSULES | Refills: 1 | OUTPATIENT
Start: 2023-10-13 | End: 2024-04-10

## 2023-10-17 ENCOUNTER — TELEPHONE ENCOUNTER (OUTPATIENT)
Dept: URBAN - METROPOLITAN AREA CLINIC 98 | Facility: CLINIC | Age: 53
End: 2023-10-17

## 2023-10-17 LAB
A/G RATIO: 2.2
ALBUMIN: 4.8
ALKALINE PHOSPHATASE: 87
ALT (SGPT): 17
ANTI-VEDOLIZUMAB ANTIBODY: <25
AST (SGOT): 15
BASO (ABSOLUTE): 0.1
BASOS: 1
BILIRUBIN, TOTAL: 0.6
BUN/CREATININE RATIO: 14
BUN: 14
C-REACTIVE PROTEIN, QUANT: 5
CALCIUM: 9.8
CARBON DIOXIDE, TOTAL: 23
CHLORIDE: 102
CREATININE: 0.97
EGFR: 70
EOS (ABSOLUTE): 0.2
EOS: 3
FERRITIN, SERUM: 103
FOLATE (FOLIC ACID), SERUM: 9.2
GLOBULIN, TOTAL: 2.2
GLUCOSE: 90
HEMATOCRIT: 44.7
HEMATOLOGY COMMENTS:: (no result)
HEMOGLOBIN: 15.1
IMMATURE CELLS: (no result)
IMMATURE GRANS (ABS): 0
IMMATURE GRANULOCYTES: 0
IRON BIND.CAP.(TIBC): 324
IRON SATURATION: 27
IRON: 87
LYMPHS (ABSOLUTE): 2.2
LYMPHS: 35
Lab: (no result)
MCH: 31.5
MCHC: 33.8
MCV: 93
MONOCYTES(ABSOLUTE): 0.6
MONOCYTES: 9
NEUTROPHILS (ABSOLUTE): 3.3
NEUTROPHILS: 52
NRBC: (no result)
PLATELETS: 322
POTASSIUM: 4.7
PROTEIN, TOTAL: 7
QUANTIFERON CRITERIA: (no result)
QUANTIFERON INCUBATION: (no result)
QUANTIFERON MITOGEN VALUE: >10
QUANTIFERON NIL VALUE: 0.06
QUANTIFERON TB1 AG VALUE: 0.07
QUANTIFERON TB2 AG VALUE: 0.07
QUANTIFERON-TB GOLD PLUS: NEGATIVE
RBC: 4.79
RDW: 12.4
SEDIMENTATION RATE-WESTERGREN: 2
SODIUM: 142
UIBC: 237
VEDOLIZUMAB: 38
VITAMIN B12: 684
VITAMIN D, 25-HYDROXY: 24.3
WBC: 6.4

## 2023-10-17 RX ORDER — ERGOCALCIFEROL CAPSULES, 1.25 MG/1
1 CAPSULE CAPSULE ORAL
Qty: 12 CAPSULES | Refills: 1
Start: 2023-10-13 | End: 2024-04-14

## 2023-10-24 ENCOUNTER — WEB ENCOUNTER (OUTPATIENT)
Dept: URBAN - METROPOLITAN AREA CLINIC 98 | Facility: CLINIC | Age: 53
End: 2023-10-24

## 2023-11-17 ENCOUNTER — OFFICE VISIT (OUTPATIENT)
Dept: URBAN - METROPOLITAN AREA CLINIC 97 | Facility: CLINIC | Age: 53
End: 2023-11-17
Payer: COMMERCIAL

## 2023-11-17 VITALS
HEIGHT: 65 IN | DIASTOLIC BLOOD PRESSURE: 77 MMHG | RESPIRATION RATE: 18 BRPM | SYSTOLIC BLOOD PRESSURE: 136 MMHG | TEMPERATURE: 97.5 F | HEART RATE: 68 BPM | BODY MASS INDEX: 33.66 KG/M2 | WEIGHT: 202 LBS

## 2023-11-17 DIAGNOSIS — K51.80 OTHER ULCERATIVE COLITIS: ICD-10-CM

## 2023-11-17 PROCEDURE — 96413 CHEMO IV INFUSION 1 HR: CPT | Performed by: INTERNAL MEDICINE

## 2023-11-17 RX ORDER — ERGOCALCIFEROL CAPSULES, 1.25 MG/1
1 CAPSULE CAPSULE ORAL
Qty: 12 CAPSULES | Refills: 1 | Status: ACTIVE | COMMUNITY
Start: 2023-10-13 | End: 2024-04-14

## 2023-12-29 ENCOUNTER — OFFICE VISIT (OUTPATIENT)
Dept: URBAN - METROPOLITAN AREA CLINIC 97 | Facility: CLINIC | Age: 53
End: 2023-12-29
Payer: COMMERCIAL

## 2023-12-29 VITALS
SYSTOLIC BLOOD PRESSURE: 128 MMHG | HEIGHT: 65 IN | RESPIRATION RATE: 18 BRPM | TEMPERATURE: 97.7 F | BODY MASS INDEX: 33.82 KG/M2 | DIASTOLIC BLOOD PRESSURE: 80 MMHG | WEIGHT: 203 LBS | HEART RATE: 65 BPM

## 2023-12-29 DIAGNOSIS — K51.80 OTHER ULCERATIVE COLITIS: ICD-10-CM

## 2023-12-29 PROCEDURE — 96413 CHEMO IV INFUSION 1 HR: CPT | Performed by: INTERNAL MEDICINE

## 2023-12-29 RX ORDER — ERGOCALCIFEROL CAPSULES, 1.25 MG/1
1 CAPSULE CAPSULE ORAL
Qty: 12 CAPSULES | Refills: 1 | Status: ACTIVE | COMMUNITY
Start: 2023-10-13 | End: 2024-04-14

## 2024-02-12 ENCOUNTER — ENT (OUTPATIENT)
Dept: URBAN - METROPOLITAN AREA CLINIC 114 | Facility: CLINIC | Age: 54
End: 2024-02-12

## 2024-02-14 ENCOUNTER — ENT (OUTPATIENT)
Dept: URBAN - METROPOLITAN AREA CLINIC 114 | Facility: CLINIC | Age: 54
End: 2024-02-14
Payer: COMMERCIAL

## 2024-02-14 VITALS
WEIGHT: 205 LBS | DIASTOLIC BLOOD PRESSURE: 91 MMHG | TEMPERATURE: 98.3 F | SYSTOLIC BLOOD PRESSURE: 148 MMHG | HEART RATE: 66 BPM | BODY MASS INDEX: 34.16 KG/M2 | HEIGHT: 65 IN | RESPIRATION RATE: 20 BRPM

## 2024-02-14 DIAGNOSIS — K51.80 CHRONIC PANCOLONIC ULCERATIVE COLITIS: ICD-10-CM

## 2024-02-14 PROCEDURE — 96413 CHEMO IV INFUSION 1 HR: CPT | Performed by: INTERNAL MEDICINE

## 2024-02-14 RX ORDER — ERGOCALCIFEROL CAPSULES, 1.25 MG/1
1 CAPSULE CAPSULE ORAL
Qty: 12 CAPSULES | Refills: 1 | Status: ACTIVE | COMMUNITY
Start: 2023-10-13 | End: 2024-04-14

## 2024-03-13 ENCOUNTER — OV EP (OUTPATIENT)
Dept: URBAN - METROPOLITAN AREA CLINIC 98 | Facility: CLINIC | Age: 54
End: 2024-03-13
Payer: COMMERCIAL

## 2024-03-13 ENCOUNTER — LAB (OUTPATIENT)
Dept: URBAN - METROPOLITAN AREA CLINIC 98 | Facility: CLINIC | Age: 54
End: 2024-03-13

## 2024-03-13 VITALS
HEART RATE: 73 BPM | BODY MASS INDEX: 34.12 KG/M2 | TEMPERATURE: 97.7 F | SYSTOLIC BLOOD PRESSURE: 138 MMHG | HEIGHT: 65 IN | WEIGHT: 204.8 LBS | DIASTOLIC BLOOD PRESSURE: 81 MMHG

## 2024-03-13 DIAGNOSIS — K51.80 OTHER ULCERATIVE COLITIS: ICD-10-CM

## 2024-03-13 DIAGNOSIS — R10.9 ABDOMINAL PAIN, UNSPECIFIED ABDOMINAL LOCATION: ICD-10-CM

## 2024-03-13 DIAGNOSIS — K51.00 CHRONIC ULCERATIVE ENTEROCOLITIS WITHOUT COMPLICATION: ICD-10-CM

## 2024-03-13 DIAGNOSIS — R13.19 OTHER DYSPHAGIA: ICD-10-CM

## 2024-03-13 DIAGNOSIS — K62.5 RECTAL BLEEDING: ICD-10-CM

## 2024-03-13 DIAGNOSIS — R19.7 DIARRHEA, UNSPECIFIED TYPE: ICD-10-CM

## 2024-03-13 DIAGNOSIS — M25.50 ARTHRALGIA, UNSPECIFIED JOINT: ICD-10-CM

## 2024-03-13 DIAGNOSIS — G89.29 OTHER CHRONIC PAIN: ICD-10-CM

## 2024-03-13 DIAGNOSIS — Z79.620 LONG TERM (CURRENT) USE OF IMMUNOSUPPRESSIVE BIOLOGIC: ICD-10-CM

## 2024-03-13 PROCEDURE — 99215 OFFICE O/P EST HI 40 MIN: CPT | Performed by: INTERNAL MEDICINE

## 2024-03-13 RX ORDER — ERGOCALCIFEROL CAPSULES, 1.25 MG/1
1 CAPSULE CAPSULE ORAL
Qty: 12 CAPSULES | Refills: 1 | Status: ACTIVE | COMMUNITY
Start: 2023-10-13 | End: 2024-04-14

## 2024-03-13 NOTE — HPI-TODAY'S VISIT:
52 yo female with UC on Entyvio 300 mg q 6 and doing well at this time. No pain diarrhea or bleeding. Last infusion at [facility]. Went well.  No UC issues. Has Entyvio issues --- joint stiffness and pain and has needed acetaminophen and naproxen.  Now getting Entyvio 300 mg iv q 6   Before entyvio was on Etrolizumab, which was great! She did well for 7 years. [id number] worked as well.  Remicade - she had an anaph reaction. Humira did not help. Simponi - did not help.  Has not seen a rheumatologist. Arthalgias of knees ankles and heels and shoulders. No joint swelling. She thinks the joint pain got worse when the Entyvio moved from q 8 to q 6. Try q 8 again. Reassess bowel and biomarkers.  Also, has an uGI sensation of tightmess and  Has had 6 episodes of meat or bread that has gottent Chewing and getting small bites. Get barium study with possible pill if no abn seen.  ============================= 2/15/23  53 yo female with 4 week f/u and had entyvio 1/24 and trough labs - 9 weeks was 10.3 and no antibodies. Took peridose steroids and had no issues. I pre-treated and did extra labs becasue the itching was a potential manifestation of an infusion reaction. Her more aggressive Crohn's diesase requires higher dosing of biologic especially with multiple past biologic failures. Etrolizumab was dosed ever 4 weeks. Few or no gi syx pre 9 week dose of Entyvio. Had no itching after Entyvio this time. previous infusios were 8 weeks.  So she needs more Entyvio at this point as a trough level of 10 at 8.5 weeks, though [facility] draw due to scheduling was maybe 10 weeks. No antibodies.  I recommend shortening interval slightly for better outcomes. Need to Rx q 6 weeks dosing and continue to monitor with labs and TDM.  It is confusing or confounding that her Lab Cristin Ent level on 1/9 was 6.7 and no antibodies and her Ent/vedo level from [facility] on 1/20/23 was 10.3 and no antibodies, 10 days later was 50% higher vedo level and so "which assay to believe".  Will add trough stool biomarkers to next pre- dose and repeat Lab cristin tdm and labs in 4-5 months. ========================================== 1/20/23  53 yo female with severe UC had last dose of Etrolizumab March-April and had first dose Entyvio on August 3 and did fine until 1-2 weeks before dose number 4, an 8 week interval, and developed increased GI syx.  Had infusion on 11/9 and developed runny nose, sneezing, itching.  Has had lot of itching now..  No real colitis syx, except Vance Franklin had mucus. Had a tinge of blood.  Today is 8.5 weeks since last infusion. 5 week [facility] trough VDZ=21.1 so 8 week likely 10 or so, and likely want higher Current syx No runny nose. Had itching 12/29 and it kept her awake.  UC syx are controlled and inactive. Mucus in stool and no blood. Prior to last dose on 11/9, had lots of GI including: urgency blood 10 or more stools a day.     ====================== 11/9/22  53 yo female with mod-severe UC for urgent TH visit.  Had 4th Entyvio infusion and .......  6 days ago, it was like she was on no medication. Abd pain subsided but got diarrhea. Lots of mucus. Took immodium. Had a flare likely due to UNDETECTABLE ENTYVIO. Urgency and cramping. Still had the rectal foam and has used it to control urgency and nausea. Today had infusion at  and as day has gone on, she got a sore throat and a runny nose flue like syx. No SOB. Fatigue. No itchiness. Lots of itching leading ups to the UC syx recurring. No flushing, but she felt neck was reddish. No tightness.  No acute rx during the infusion.    ========================= 9/12/22  52 yo female with moderate to severe UC comes in for 2 month f/u. Has had 2 doses of Entyvio, last dose Aug and 3rd dose in 2 days. and is cautiously better. UC is not worsening is how much benefit is she getting from Entyvio vs steroid  Hydrocortisone foam 10% acetate From [employer]. Will try decreasing to qod and d/c if Entyvio really seems to be helping  Rectal bleeding is all gone. Frequency is now 3-4 by day, it is solid stool and smaller size. No pain now. Before foam, had abd pain. Had pancolitis at one time.     ============================ 7/18/22  52 yo female recently completed the etrolizumab study which ended.  Summer was on this study since 2015 and did fabulous.  The study was prematurely ended due to ????efficacy but it was highly efficacious for her.  Last dose of etrolizumab was March-April and she was asyx then.  Syx recurred in June, 1-2 months after last dose.  Had one episode of flare with diarrhea. She has a complete change in BM and the size of stool. More blood. Stool or tissue or mucous.  Little abdominal pain. Is having low back pain. At initial dx, [id number], had syx like this and worse.  Sulfa allergic and dose not tolerate mesalamine. These agents worsen her UC.  No other medical illnesses.  No recent antibiotics and no h/o C diff.  Probiotics do not help. Tumeric in past did not help.  Tried steroid foam that was a temporary fix. Does not think she can wait a month for a study.   Past treatment at dx 2012 included mesalamine that made her worse - sulfa allergy: asacol. Prednisone: effective but with side effects-- fatigue Past Remicade --- anaphylactic rx with third dose ---- underdosed.  Humira - no benefit Simponi - no benefit

## 2024-03-13 NOTE — PHYSICAL EXAM NEUROLOGIC:
oriented to person, place and time, short and long term memory intact, cooperative with exam, sensory exam intact

## 2024-03-25 LAB
A/G RATIO: 2
ALBUMIN: 4.3
ALKALINE PHOSPHATASE: 66
ALT (SGPT): 19
ANTI-VEDOLIZUMAB ANTIBODY: <25
AST (SGOT): 18
BASO (ABSOLUTE): 0
BASOS: 1
BILIRUBIN, TOTAL: 0.6
BUN/CREATININE RATIO: 13
BUN: 12
C-REACTIVE PROTEIN, QUANT: 7
CALCIUM: 9.5
CARBON DIOXIDE, TOTAL: 21
CHLORIDE: 102
CREATININE: 0.92
EGFR: 75
EOS (ABSOLUTE): 0.2
EOS: 3
FERRITIN, SERUM: 156
FOLATE (FOLIC ACID), SERUM: 12.8
GLOBULIN, TOTAL: 2.2
GLUCOSE: 85
HBSAG SCREEN: NEGATIVE
HEMATOCRIT: 44.3
HEMATOLOGY COMMENTS:: (no result)
HEMOGLOBIN: 14.5
HEP B SURFACE AB, QUAL: REACTIVE
IMMATURE CELLS: (no result)
IMMATURE GRANS (ABS): 0
IMMATURE GRANULOCYTES: 0
IRON BIND.CAP.(TIBC): 351
IRON SATURATION: 33
IRON: 117
LYMPHS (ABSOLUTE): 1.9
LYMPHS: 34
Lab: (no result)
MCH: 30.7
MCHC: 32.7
MCV: 94
MONOCYTES(ABSOLUTE): 0.4
MONOCYTES: 7
NEUTROPHILS (ABSOLUTE): 3.1
NEUTROPHILS: 55
NRBC: (no result)
PLATELETS: 332
POTASSIUM: 4.3
PROTEIN, TOTAL: 6.5
QUANTIFERON CRITERIA: (no result)
QUANTIFERON INCUBATION: (no result)
QUANTIFERON MITOGEN VALUE: >10
QUANTIFERON NIL VALUE: 0.04
QUANTIFERON TB1 AG VALUE: 0.04
QUANTIFERON TB2 AG VALUE: 0.03
QUANTIFERON-TB GOLD PLUS: NEGATIVE
RBC: 4.72
RDW: 12.6
SEDIMENTATION RATE-WESTERGREN: 2
SODIUM: 140
UIBC: 234
VEDOLIZUMAB: 35
VITAMIN B12: 507
VITAMIN D, 25-HYDROXY: 37.3
WBC: 5.6

## 2024-04-01 ENCOUNTER — EGD (OUTPATIENT)
Dept: URBAN - METROPOLITAN AREA SURGERY CENTER 18 | Facility: SURGERY CENTER | Age: 54
End: 2024-04-01
Payer: COMMERCIAL

## 2024-04-01 ENCOUNTER — LAB (OUTPATIENT)
Dept: URBAN - METROPOLITAN AREA CLINIC 4 | Facility: CLINIC | Age: 54
End: 2024-04-01
Payer: COMMERCIAL

## 2024-04-01 DIAGNOSIS — R13.19 CERVICAL DYSPHAGIA: ICD-10-CM

## 2024-04-01 DIAGNOSIS — K21.9 GASTRO-ESOPHAGEAL REFLUX DISEASE WITHOUT ESOPHAGITIS: ICD-10-CM

## 2024-04-01 DIAGNOSIS — K31.89 OTHER DISEASES OF STOMACH AND DUODENUM: ICD-10-CM

## 2024-04-01 DIAGNOSIS — K29.70 GASTRITIS, UNSPECIFIED, WITHOUT BLEEDING: ICD-10-CM

## 2024-04-01 DIAGNOSIS — K22.2 ACQUIRED ESOPHAGEAL RING: ICD-10-CM

## 2024-04-01 DIAGNOSIS — K21.9 ACID REFLUX: ICD-10-CM

## 2024-04-01 PROCEDURE — 88312 SPECIAL STAINS GROUP 1: CPT | Performed by: PATHOLOGY

## 2024-04-01 PROCEDURE — 43239 EGD BIOPSY SINGLE/MULTIPLE: CPT | Performed by: INTERNAL MEDICINE

## 2024-04-01 PROCEDURE — G8907 PT DOC NO EVENTS ON DISCHARG: HCPCS | Performed by: INTERNAL MEDICINE

## 2024-04-01 PROCEDURE — 43248 EGD GUIDE WIRE INSERTION: CPT | Performed by: INTERNAL MEDICINE

## 2024-04-01 PROCEDURE — 88305 TISSUE EXAM BY PATHOLOGIST: CPT | Performed by: PATHOLOGY

## 2024-04-01 RX ORDER — METHOTREXATE SODIUM 2.5 MG/1
3 TABLETS TABLET ORAL
Qty: 36 TABLETS | Refills: 3 | Status: ACTIVE | COMMUNITY
Start: 2024-03-13

## 2024-04-01 RX ORDER — OMEPRAZOLE 40 MG/1
1 CAPSULE 30 MINUTES BEFORE MORNING MEAL CAPSULE, DELAYED RELEASE ORAL ONCE A DAY
Qty: 90 CAPSULES | Refills: 3 | Status: ACTIVE | COMMUNITY
Start: 2024-03-13

## 2024-04-01 RX ORDER — FOLIC ACID 1 MG/1
1 TABLET TABLET ORAL ONCE A DAY
Qty: 90 TABLET | Refills: 3 | Status: ACTIVE | COMMUNITY
Start: 2024-03-13 | End: 2025-03-07

## 2024-04-01 RX ORDER — ERGOCALCIFEROL CAPSULES, 1.25 MG/1
1 CAPSULE CAPSULE ORAL
Qty: 12 CAPSULES | Refills: 1 | Status: ACTIVE | COMMUNITY
Start: 2023-10-13 | End: 2024-04-14

## 2024-04-10 ENCOUNTER — ENT (OUTPATIENT)
Dept: URBAN - METROPOLITAN AREA CLINIC 114 | Facility: CLINIC | Age: 54
End: 2024-04-10
Payer: COMMERCIAL

## 2024-04-10 VITALS
WEIGHT: 205 LBS | HEART RATE: 68 BPM | SYSTOLIC BLOOD PRESSURE: 137 MMHG | TEMPERATURE: 98.1 F | RESPIRATION RATE: 20 BRPM | BODY MASS INDEX: 34.16 KG/M2 | DIASTOLIC BLOOD PRESSURE: 91 MMHG | HEIGHT: 65 IN

## 2024-04-10 DIAGNOSIS — K51.80 CHRONIC PANCOLONIC ULCERATIVE COLITIS: ICD-10-CM

## 2024-04-10 PROCEDURE — 96413 CHEMO IV INFUSION 1 HR: CPT | Performed by: INTERNAL MEDICINE

## 2024-04-10 RX ORDER — FOLIC ACID 1 MG/1
1 TABLET TABLET ORAL ONCE A DAY
Qty: 90 TABLET | Refills: 3 | Status: ACTIVE | COMMUNITY
Start: 2024-03-13 | End: 2025-03-07

## 2024-04-10 RX ORDER — METHOTREXATE SODIUM 2.5 MG/1
3 TABLETS TABLET ORAL
Qty: 36 TABLETS | Refills: 3 | Status: ACTIVE | COMMUNITY
Start: 2024-03-13

## 2024-04-10 RX ORDER — ERGOCALCIFEROL CAPSULES, 1.25 MG/1
1 CAPSULE CAPSULE ORAL
Qty: 12 CAPSULES | Refills: 1 | Status: ACTIVE | COMMUNITY
Start: 2023-10-13 | End: 2024-04-14

## 2024-04-10 RX ORDER — OMEPRAZOLE 40 MG/1
1 CAPSULE 30 MINUTES BEFORE MORNING MEAL CAPSULE, DELAYED RELEASE ORAL ONCE A DAY
Qty: 90 CAPSULES | Refills: 3 | Status: ACTIVE | COMMUNITY
Start: 2024-03-13

## 2024-06-05 ENCOUNTER — OFFICE VISIT (OUTPATIENT)
Dept: URBAN - METROPOLITAN AREA CLINIC 114 | Facility: CLINIC | Age: 54
End: 2024-06-05
Payer: COMMERCIAL

## 2024-06-05 VITALS
HEIGHT: 65 IN | WEIGHT: 206 LBS | SYSTOLIC BLOOD PRESSURE: 118 MMHG | HEART RATE: 83 BPM | RESPIRATION RATE: 20 BRPM | DIASTOLIC BLOOD PRESSURE: 81 MMHG | TEMPERATURE: 97.9 F | BODY MASS INDEX: 34.32 KG/M2

## 2024-06-05 DIAGNOSIS — K51.80 OTHER ULCERATIVE COLITIS: ICD-10-CM

## 2024-06-05 PROCEDURE — 96413 CHEMO IV INFUSION 1 HR: CPT | Performed by: INTERNAL MEDICINE

## 2024-06-05 RX ORDER — METHOTREXATE SODIUM 2.5 MG/1
3 TABLETS TABLET ORAL
Qty: 36 TABLETS | Refills: 3 | Status: ACTIVE | COMMUNITY
Start: 2024-03-13

## 2024-06-05 RX ORDER — OMEPRAZOLE 40 MG/1
1 CAPSULE 30 MINUTES BEFORE MORNING MEAL CAPSULE, DELAYED RELEASE ORAL ONCE A DAY
Qty: 90 CAPSULES | Refills: 3 | Status: ACTIVE | COMMUNITY
Start: 2024-03-13

## 2024-06-05 RX ORDER — FOLIC ACID 1 MG/1
1 TABLET TABLET ORAL ONCE A DAY
Qty: 90 TABLET | Refills: 3 | Status: ACTIVE | COMMUNITY
Start: 2024-03-13 | End: 2025-03-07

## 2024-07-31 ENCOUNTER — OFFICE VISIT (OUTPATIENT)
Dept: URBAN - METROPOLITAN AREA CLINIC 114 | Facility: CLINIC | Age: 54
End: 2024-07-31
Payer: COMMERCIAL

## 2024-07-31 VITALS
HEIGHT: 65 IN | SYSTOLIC BLOOD PRESSURE: 121 MMHG | RESPIRATION RATE: 20 BRPM | DIASTOLIC BLOOD PRESSURE: 79 MMHG | HEART RATE: 65 BPM | TEMPERATURE: 98.1 F | BODY MASS INDEX: 34.16 KG/M2 | WEIGHT: 205 LBS

## 2024-07-31 DIAGNOSIS — K51.80 CHRONIC PANCOLONIC ULCERATIVE COLITIS: ICD-10-CM

## 2024-07-31 PROCEDURE — 96413 CHEMO IV INFUSION 1 HR: CPT | Performed by: INTERNAL MEDICINE

## 2024-07-31 RX ORDER — OMEPRAZOLE 40 MG/1
1 CAPSULE 30 MINUTES BEFORE MORNING MEAL CAPSULE, DELAYED RELEASE ORAL ONCE A DAY
Qty: 90 CAPSULES | Refills: 3 | Status: ACTIVE | COMMUNITY
Start: 2024-03-13

## 2024-07-31 RX ORDER — METHOTREXATE SODIUM 2.5 MG/1
3 TABLETS TABLET ORAL
Qty: 36 TABLETS | Refills: 3 | Status: ACTIVE | COMMUNITY
Start: 2024-03-13

## 2024-07-31 RX ORDER — FOLIC ACID 1 MG/1
1 TABLET TABLET ORAL ONCE A DAY
Qty: 90 TABLET | Refills: 3 | Status: ACTIVE | COMMUNITY
Start: 2024-03-13 | End: 2025-03-07

## 2024-09-24 ENCOUNTER — OFFICE VISIT (OUTPATIENT)
Dept: URBAN - METROPOLITAN AREA CLINIC 114 | Facility: CLINIC | Age: 54
End: 2024-09-24
Payer: COMMERCIAL

## 2024-09-24 VITALS
RESPIRATION RATE: 20 BRPM | WEIGHT: 207 LBS | HEIGHT: 65 IN | TEMPERATURE: 98 F | HEART RATE: 68 BPM | SYSTOLIC BLOOD PRESSURE: 140 MMHG | DIASTOLIC BLOOD PRESSURE: 86 MMHG | BODY MASS INDEX: 34.49 KG/M2

## 2024-09-24 DIAGNOSIS — K51.80 CHRONIC PANCOLONIC ULCERATIVE COLITIS: ICD-10-CM

## 2024-09-24 PROCEDURE — 96413 CHEMO IV INFUSION 1 HR: CPT | Performed by: INTERNAL MEDICINE

## 2024-09-24 RX ORDER — METHOTREXATE SODIUM 2.5 MG/1
3 TABLETS TABLET ORAL
Qty: 36 TABLETS | Refills: 3 | Status: ACTIVE | COMMUNITY
Start: 2024-03-13

## 2024-09-24 RX ORDER — FOLIC ACID 1 MG/1
1 TABLET TABLET ORAL ONCE A DAY
Qty: 90 TABLET | Refills: 3 | Status: ACTIVE | COMMUNITY
Start: 2024-03-13 | End: 2025-03-07

## 2024-09-24 RX ORDER — OMEPRAZOLE 40 MG/1
1 CAPSULE 30 MINUTES BEFORE MORNING MEAL CAPSULE, DELAYED RELEASE ORAL ONCE A DAY
Qty: 90 CAPSULES | Refills: 3 | Status: ACTIVE | COMMUNITY
Start: 2024-03-13

## 2024-10-16 ENCOUNTER — DASHBOARD ENCOUNTERS (OUTPATIENT)
Age: 54
End: 2024-10-16

## 2024-10-16 ENCOUNTER — LAB OUTSIDE AN ENCOUNTER (OUTPATIENT)
Dept: URBAN - METROPOLITAN AREA CLINIC 98 | Facility: CLINIC | Age: 54
End: 2024-10-16

## 2024-10-16 ENCOUNTER — OFFICE VISIT (OUTPATIENT)
Dept: URBAN - METROPOLITAN AREA CLINIC 98 | Facility: CLINIC | Age: 54
End: 2024-10-16
Payer: COMMERCIAL

## 2024-10-16 VITALS
DIASTOLIC BLOOD PRESSURE: 84 MMHG | SYSTOLIC BLOOD PRESSURE: 141 MMHG | BODY MASS INDEX: 34.49 KG/M2 | HEART RATE: 75 BPM | WEIGHT: 207 LBS | TEMPERATURE: 97.8 F | HEIGHT: 65 IN

## 2024-10-16 DIAGNOSIS — R19.7 DIARRHEA, UNSPECIFIED TYPE: ICD-10-CM

## 2024-10-16 DIAGNOSIS — G89.29 OTHER CHRONIC PAIN: ICD-10-CM

## 2024-10-16 DIAGNOSIS — M25.50 ARTHRALGIA, UNSPECIFIED JOINT: ICD-10-CM

## 2024-10-16 DIAGNOSIS — K51.00 CHRONIC ULCERATIVE ENTEROCOLITIS WITHOUT COMPLICATION: ICD-10-CM

## 2024-10-16 DIAGNOSIS — Z79.620 LONG TERM (CURRENT) USE OF IMMUNOSUPPRESSIVE BIOLOGIC: ICD-10-CM

## 2024-10-16 DIAGNOSIS — R10.9 ABDOMINAL PAIN, UNSPECIFIED ABDOMINAL LOCATION: ICD-10-CM

## 2024-10-16 DIAGNOSIS — K62.5 RECTAL BLEEDING: ICD-10-CM

## 2024-10-16 DIAGNOSIS — K51.80 OTHER ULCERATIVE COLITIS: ICD-10-CM

## 2024-10-16 PROCEDURE — 99215 OFFICE O/P EST HI 40 MIN: CPT | Performed by: INTERNAL MEDICINE

## 2024-10-16 RX ORDER — OMEPRAZOLE 40 MG/1
1 CAPSULE 30 MINUTES BEFORE MORNING MEAL CAPSULE, DELAYED RELEASE ORAL ONCE A DAY
Qty: 90 CAPSULES | Refills: 3 | Status: ACTIVE | COMMUNITY
Start: 2024-03-13

## 2024-10-16 RX ORDER — METHOTREXATE SODIUM 2.5 MG/1
3 TABLETS TABLET ORAL
Qty: 36 TABLETS | Refills: 3 | Status: ACTIVE | COMMUNITY
Start: 2024-03-13

## 2024-10-16 RX ORDER — FOLIC ACID 1 MG/1
1 TABLET TABLET ORAL ONCE A DAY
Qty: 90 TABLET | Refills: 3 | Status: ACTIVE | COMMUNITY
Start: 2024-03-13 | End: 2025-03-07

## 2024-10-16 NOTE — HPI-TODAY'S VISIT:
55 yo female with UC returns for 7 month f/u. Doing Entyvio 300 q 8 and doing great.  No issues.  Joint aches knees, elbows.  Sulfa allergic  worsens UC . Mtx -Pril - one dose made her feel red and hot.   ========================== 3/13/24  54 yo female with UC on Entyvio 300 mg q 6 and doing well at this time. No pain diarrhea or bleeding. Last infusion at [place]. Went well.  No UC issues. Has Entyvio issues - - joint stiffness and pain and has needed acetaminophen and naproxen.  Now getting Entyvio 300 mg iv q 6   Before entyvio was on Etrolizumab, which was great! She did well for 7 years. [id number] worked as well.  Remicade - she had an anaph reaction. Humira did not help. Simponi - did not help.  Has not seen a rheumatologist. Arthalgias of knees ankles and heels and shoulders. No joint swelling. She thinks the joint pain got worse when the Entyvio moved from q 8 to q 6. Try q 8 again. Reassess bowel and biomarkers.  Also, has an uGI sensation of tightmess and  Has had 6 episodes of meat or bread that has gottent Chewing and getting small bites. Get barium study with possible pill if no abn seen.  ============================= 2/15/23  53 yo female with 4 week f/u and had entyvio 1/24 and trough labs - 9 weeks was 10.3 and no antibodies. Took peridose steroids and had no issues. I pre-treated and did extra labs becasue the itching was a potential manifestation of an infusion reaction. Her more aggressive Crohn's diesase requires higher dosing of biologic especially with multiple past biologic failures. Etrolizumab was dosed ever 4 weeks. Few or no gi syx pre 9 week dose of Entyvio. Had no itching after Entyvio this time. previous infusios were 8 weeks.  So she needs more Entyvio at this point as a trough level of 10 at 8.5 weeks, though [facility] draw due to scheduling was maybe 10 weeks. No antibodies.  I recommend shortening interval slightly for better outcomes. Need to Rx q 6 weeks dosing and continue to monitor with labs and TDM.  It is confusing or confounding that her Lab Cristin Ent level on 1/9 was 6.7 and no antibodies and her Ent/vedo level from [facility] on 1/20/23 was 10.3 and no antibodies, 10 days later was 50% higher vedo level and so "which assay to believe".  Will add trough stool biomarkers to next pre- dose and repeat Lab cristin tdm and labs in 4-5 months. ========================================== 1/20/23  53 yo female with severe UC had last dose of Etrolizumab March-April and had first dose Entyvio on August 3 and did fine until 1-2 weeks before dose number 4, an 8 week interval, and developed increased GI syx.  Had infusion on 11/9 and developed runny nose, sneezing, itching.  Has had lot of itching now..  No real colitis syx, except [place] had mucus. Had a tinge of blood.  Today is 8.5 weeks since last infusion. 5 week [facility] trough VDZ=21.1 so 8 week likely 10 or so, and likely want higher Current syx No runny nose. Had itching 12/29 and it kept her awake.  UC syx are controlled and inactive. Mucus in stool and no blood. Prior to last dose on 11/9, had lots of GI including: urgency blood 10 or more stools a day.     ====================== 11/9/22  53 yo female with mod-severe UC for urgent TH visit.  Had 4th Entyvio infusion and .......  6 days ago, it was like she was on no medication. Abd pain subsided but got diarrhea. Lots of mucus. Took immodium. Had a flare likely due to UNDETECTABLE ENTYVIO. Urgency and cramping. Still had the rectal foam and has used it to control urgency and nausea. Today had infusion at  and as day has gone on, she got a sore throat and a runny nose flue like syx. No SOB. Fatigue. No itchiness. Lots of itching leading ups to the UC syx recurring. No flushing, but she felt neck was reddish. No tightness.  No acute rx during the infusion.    ========================= 9/12/22  52 yo female with moderate to severe UC comes in for 2 month f/u. Has had 2 doses of Entyvio, last dose Aug and 3rd dose in 2 days. and is cautiously better. UC is not worsening is how much benefit is she getting from Entyvio vs steroid  Hydrocortisone foam 10% acetate From [employer]. Will try decreasing to qod and d/c if Entyvio really seems to be helping  Rectal bleeding is all gone. Frequency is now 3-4 by day, it is solid stool and smaller size. No pain now. Before foam, had abd pain. Had pancolitis at one time.     ============================ 7/18/22  52 yo female recently completed the etrolizumab study which ended.  Summer was on this study since 2015 and did fabulous.  The study was prematurely ended due to ????efficacy but it was highly efficacious for her.  Last dose of etrolizumab was March-April and she was asyx then.  Syx recurred in June, 1-2 months after last dose.  Had one episode of flare with diarrhea. She has a complete change in BM and the size of stool. More blood. Stool or tissue or mucous.  Little abdominal pain. Is having low back pain. At initial dx, [id number], had syx like this and worse.  Sulfa allergic and dose not tolerate mesalamine. These agents worsen her UC.  No other medical illnesses.  No recent antibiotics and no h/o C diff.  Probiotics do not help. Tumeric in past did not help.  Tried steroid foam that was a temporary fix. Does not think she can wait a month for a study.   Past treatment at dx 2012 included mesalamine that made her worse - sulfa allergy: asacol. Prednisone: effective but with side effects-- fatigue Past Remicade - - anaphylactic rx with third dose - -  underdosed.  Humira - no benefit Simponi - no benefit

## 2024-11-20 ENCOUNTER — OFFICE VISIT (OUTPATIENT)
Dept: URBAN - METROPOLITAN AREA CLINIC 114 | Facility: CLINIC | Age: 54
End: 2024-11-20
Payer: COMMERCIAL

## 2024-11-20 VITALS
TEMPERATURE: 97.7 F | WEIGHT: 207 LBS | DIASTOLIC BLOOD PRESSURE: 81 MMHG | HEIGHT: 65 IN | SYSTOLIC BLOOD PRESSURE: 129 MMHG | BODY MASS INDEX: 34.49 KG/M2 | RESPIRATION RATE: 20 BRPM | HEART RATE: 73 BPM

## 2024-11-20 DIAGNOSIS — K51.90 CHRONIC ULCERATIVE COLITIS: ICD-10-CM

## 2024-11-20 PROCEDURE — 96413 CHEMO IV INFUSION 1 HR: CPT | Performed by: INTERNAL MEDICINE

## 2024-11-20 RX ORDER — OMEPRAZOLE 40 MG/1
1 CAPSULE 30 MINUTES BEFORE MORNING MEAL CAPSULE, DELAYED RELEASE ORAL ONCE A DAY
Qty: 90 CAPSULES | Refills: 3 | Status: ACTIVE | COMMUNITY
Start: 2024-03-13

## 2024-11-20 RX ORDER — METHOTREXATE SODIUM 2.5 MG/1
3 TABLETS TABLET ORAL
Qty: 36 TABLETS | Refills: 3 | Status: ACTIVE | COMMUNITY
Start: 2024-03-13

## 2024-11-20 RX ORDER — FOLIC ACID 1 MG/1
1 TABLET TABLET ORAL ONCE A DAY
Qty: 90 TABLET | Refills: 3 | Status: ACTIVE | COMMUNITY
Start: 2024-03-13 | End: 2025-03-07

## 2025-01-14 ENCOUNTER — OFFICE VISIT (OUTPATIENT)
Dept: URBAN - METROPOLITAN AREA CLINIC 114 | Facility: CLINIC | Age: 55
End: 2025-01-14
Payer: COMMERCIAL

## 2025-01-14 VITALS
BODY MASS INDEX: 34.49 KG/M2 | TEMPERATURE: 98.1 F | DIASTOLIC BLOOD PRESSURE: 86 MMHG | RESPIRATION RATE: 20 BRPM | HEART RATE: 63 BPM | SYSTOLIC BLOOD PRESSURE: 137 MMHG | HEIGHT: 65 IN | WEIGHT: 207 LBS

## 2025-01-14 DIAGNOSIS — K51.00 CHRONIC ULCERATIVE ENTEROCOLITIS WITHOUT COMPLICATION: ICD-10-CM

## 2025-01-14 PROCEDURE — 96413 CHEMO IV INFUSION 1 HR: CPT | Performed by: INTERNAL MEDICINE

## 2025-01-14 RX ORDER — METHOTREXATE SODIUM 2.5 MG/1
3 TABLETS TABLET ORAL
Qty: 36 TABLETS | Refills: 3 | Status: ACTIVE | COMMUNITY
Start: 2024-03-13

## 2025-01-14 RX ORDER — FOLIC ACID 1 MG/1
1 TABLET TABLET ORAL ONCE A DAY
Qty: 90 TABLET | Refills: 3 | Status: ACTIVE | COMMUNITY
Start: 2024-03-13 | End: 2025-03-07

## 2025-01-14 RX ORDER — OMEPRAZOLE 40 MG/1
1 CAPSULE 30 MINUTES BEFORE MORNING MEAL CAPSULE, DELAYED RELEASE ORAL ONCE A DAY
Qty: 90 CAPSULES | Refills: 3 | Status: ACTIVE | COMMUNITY
Start: 2024-03-13

## 2025-03-04 ENCOUNTER — OFFICE VISIT (OUTPATIENT)
Dept: URBAN - METROPOLITAN AREA SURGERY CENTER 18 | Facility: SURGERY CENTER | Age: 55
End: 2025-03-04
Payer: COMMERCIAL

## 2025-03-04 ENCOUNTER — CLAIMS CREATED FROM THE CLAIM WINDOW (OUTPATIENT)
Dept: URBAN - METROPOLITAN AREA CLINIC 4 | Facility: CLINIC | Age: 55
End: 2025-03-04
Payer: COMMERCIAL

## 2025-03-04 DIAGNOSIS — K63.89 OTHER SPECIFIED DISEASES OF INTESTINE: ICD-10-CM

## 2025-03-04 DIAGNOSIS — K51.00 ACUTE ULCERATIVE PANCOLITIS: ICD-10-CM

## 2025-03-04 DIAGNOSIS — K51.90 ULCERATIVE COLITIS: ICD-10-CM

## 2025-03-04 PROCEDURE — 00811 ANES LWR INTST NDSC NOS: CPT | Performed by: NURSE ANESTHETIST, CERTIFIED REGISTERED

## 2025-03-04 PROCEDURE — 88305 TISSUE EXAM BY PATHOLOGIST: CPT | Performed by: PATHOLOGY

## 2025-03-04 PROCEDURE — 45380 COLONOSCOPY AND BIOPSY: CPT | Performed by: INTERNAL MEDICINE

## 2025-03-04 RX ORDER — OMEPRAZOLE 40 MG/1
1 CAPSULE 30 MINUTES BEFORE MORNING MEAL CAPSULE, DELAYED RELEASE ORAL ONCE A DAY
Qty: 90 CAPSULES | Refills: 3 | Status: ACTIVE | COMMUNITY
Start: 2024-03-13

## 2025-03-04 RX ORDER — METHOTREXATE SODIUM 2.5 MG/1
3 TABLETS TABLET ORAL
Qty: 36 TABLETS | Refills: 3 | Status: ACTIVE | COMMUNITY
Start: 2024-03-13

## 2025-03-04 RX ORDER — FOLIC ACID 1 MG/1
1 TABLET TABLET ORAL ONCE A DAY
Qty: 90 TABLET | Refills: 3 | Status: ACTIVE | COMMUNITY
Start: 2024-03-13 | End: 2025-03-07

## 2025-03-05 ENCOUNTER — TELEPHONE ENCOUNTER (OUTPATIENT)
Dept: URBAN - METROPOLITAN AREA CLINIC 117 | Facility: CLINIC | Age: 55
End: 2025-03-05

## 2025-03-05 RX ORDER — VEDOLIZUMAB 300 MG/5ML
AS DIRECTED INJECTION, POWDER, LYOPHILIZED, FOR SOLUTION INTRAVENOUS
Qty: 300 EACH | Refills: 8

## 2025-03-05 RX ORDER — VEDOLIZUMAB 300 MG/5ML
AS DIRECTED INJECTION, POWDER, LYOPHILIZED, FOR SOLUTION INTRAVENOUS
OUTPATIENT
Start: 2025-03-09

## 2025-03-11 ENCOUNTER — OFFICE VISIT (OUTPATIENT)
Dept: URBAN - METROPOLITAN AREA CLINIC 114 | Facility: CLINIC | Age: 55
End: 2025-03-11
Payer: COMMERCIAL

## 2025-03-11 VITALS
WEIGHT: 205 LBS | HEART RATE: 72 BPM | BODY MASS INDEX: 34.16 KG/M2 | TEMPERATURE: 97.8 F | DIASTOLIC BLOOD PRESSURE: 87 MMHG | SYSTOLIC BLOOD PRESSURE: 128 MMHG | HEIGHT: 65 IN | RESPIRATION RATE: 20 BRPM

## 2025-03-11 DIAGNOSIS — K51.00 ACUTE ULCERATIVE PANCOLITIS: ICD-10-CM

## 2025-03-11 PROCEDURE — 96413 CHEMO IV INFUSION 1 HR: CPT | Performed by: INTERNAL MEDICINE

## 2025-03-11 RX ORDER — METHOTREXATE SODIUM 2.5 MG/1
3 TABLETS TABLET ORAL
Qty: 36 TABLETS | Refills: 3 | Status: ACTIVE | COMMUNITY
Start: 2024-03-13

## 2025-03-11 RX ORDER — OMEPRAZOLE 40 MG/1
1 CAPSULE 30 MINUTES BEFORE MORNING MEAL CAPSULE, DELAYED RELEASE ORAL ONCE A DAY
Qty: 90 CAPSULES | Refills: 3 | Status: ACTIVE | COMMUNITY
Start: 2024-03-13

## 2025-03-11 RX ORDER — VEDOLIZUMAB 300 MG/5ML
AS DIRECTED INJECTION, POWDER, LYOPHILIZED, FOR SOLUTION INTRAVENOUS
Qty: 300 EACH | Refills: 8 | Status: ACTIVE | COMMUNITY

## 2025-03-11 RX ORDER — VEDOLIZUMAB 300 MG/5ML
AS DIRECTED INJECTION, POWDER, LYOPHILIZED, FOR SOLUTION INTRAVENOUS
Status: ACTIVE | COMMUNITY
Start: 2025-03-09

## 2025-04-28 ENCOUNTER — OFFICE VISIT (OUTPATIENT)
Dept: URBAN - METROPOLITAN AREA TELEHEALTH 2 | Facility: TELEHEALTH | Age: 55
End: 2025-04-28
Payer: COMMERCIAL

## 2025-04-28 DIAGNOSIS — R19.7 DIARRHEA, UNSPECIFIED TYPE: ICD-10-CM

## 2025-04-28 DIAGNOSIS — K62.5 RECTAL BLEEDING: ICD-10-CM

## 2025-04-28 DIAGNOSIS — G89.29 OTHER CHRONIC PAIN: ICD-10-CM

## 2025-04-28 DIAGNOSIS — K51.00 CHRONIC ULCERATIVE ENTEROCOLITIS WITHOUT COMPLICATION: ICD-10-CM

## 2025-04-28 DIAGNOSIS — M25.50 ARTHRALGIA, UNSPECIFIED JOINT: ICD-10-CM

## 2025-04-28 DIAGNOSIS — R10.84 ABDOMINAL CRAMPING, GENERALIZED: ICD-10-CM

## 2025-04-28 PROBLEM — 58103005: Status: ACTIVE | Noted: 2025-04-28

## 2025-04-28 PROCEDURE — 99214 OFFICE O/P EST MOD 30 MIN: CPT | Performed by: INTERNAL MEDICINE

## 2025-04-28 RX ORDER — OMEPRAZOLE 40 MG/1
1 CAPSULE 30 MINUTES BEFORE MORNING MEAL CAPSULE, DELAYED RELEASE ORAL ONCE A DAY
Qty: 90 CAPSULES | Refills: 3 | Status: ACTIVE | COMMUNITY
Start: 2024-03-13

## 2025-04-28 RX ORDER — METHOTREXATE SODIUM 2.5 MG/1
3 TABLETS TABLET ORAL
Qty: 36 TABLETS | Refills: 3 | Status: ACTIVE | COMMUNITY
Start: 2024-03-13

## 2025-04-28 RX ORDER — VEDOLIZUMAB 300 MG/5ML
AS DIRECTED INJECTION, POWDER, LYOPHILIZED, FOR SOLUTION INTRAVENOUS
Qty: 300 EACH | Refills: 8 | Status: ACTIVE | COMMUNITY

## 2025-04-28 NOTE — HPI-TODAY'S VISIT:
55 yo female with UC in remission on Entyvio Doing great. Had no joint aches on study med, etrolizumab That ended. Joint aches started with Entyvio, within 6 weeks.  Intolerant of mtx.  UC- no pain diarrhea or bleeding.  Last colonoscopy was March.    ====== 10/16/24   55 yo female with UC returns for 7 month f/u. Doing Entyvio 300 q 8 and doing great.  No issues.  Joint aches knees, elbows.  Sulfa allergic  worsens UC . Mtx -Pril - one dose made her feel red and hot.   ========================== 3/13/24  54 yo female with UC on Entyvio 300 mg q 6 and doing well at this time. No pain diarrhea or bleeding. Last infusion at [place]. Went well.  No UC issues. Has Entyvio issues - - joint stiffness and pain and has needed acetaminophen and naproxen.  Now getting Entyvio 300 mg iv q 6   Before entyvio was on Etrolizumab, which was great! She did well for 7 years. [id number] worked as well.  Remicade - she had an anaph reaction. Humira did not help. Simponi - did not help.  Has not seen a rheumatologist. Arthalgias of knees ankles and heels and shoulders. No joint swelling. She thinks the joint pain got worse when the Entyvio moved from q 8 to q 6. Try q 8 again. Reassess bowel and biomarkers.  Also, has an uGI sensation of tightmess and  Has had 6 episodes of meat or bread that has gottent Chewing and getting small bites. Get barium study with possible pill if no abn seen.  ============================= 2/15/23  53 yo female with 4 week f/u and had entyvio 1/24 and trough labs - 9 weeks was 10.3 and no antibodies. Took peridose steroids and had no issues. I pre-treated and did extra labs becasue the itching was a potential manifestation of an infusion reaction. Her more aggressive Crohn's diesase requires higher dosing of biologic especially with multiple past biologic failures. Etrolizumab was dosed ever 4 weeks. Few or no gi syx pre 9 week dose of Entyvio. Had no itching after Entyvio this time. previous infusios were 8 weeks.  So she needs more Entyvio at this point as a trough level of 10 at 8.5 weeks, though [facility] draw due to scheduling was maybe 10 weeks. No antibodies.  I recommend shortening interval slightly for better outcomes. Need to Rx q 6 weeks dosing and continue to monitor with labs and TDM.  It is confusing or confounding that her Lab Cristin Ent level on 1/9 was 6.7 and no antibodies and her Ent/vedo level from [facility] on 1/20/23 was 10.3 and no antibodies, 10 days later was 50% higher vedo level and so "which assay to believe".  Will add trough stool biomarkers to next pre- dose and repeat Lab cristin tdm and labs in 4-5 months. ========================================== 1/20/23  53 yo female with severe UC had last dose of Etrolizumab March-April and had first dose Entyvio on August 3 and did fine until 1-2 weeks before dose number 4, an 8 week interval, and developed increased GI syx.  Had infusion on 11/9 and developed runny nose, sneezing, itching.  Has had lot of itching now..  No real colitis syx, except [place] had mucus. Had a tinge of blood.  Today is 8.5 weeks since last infusion. 5 week [facility] trough VDZ=21.1 so 8 week likely 10 or so, and likely want higher Current syx No runny nose. Had itching 12/29 and it kept her awake.  UC syx are controlled and inactive. Mucus in stool and no blood. Prior to last dose on 11/9, had lots of GI including: urgency blood 10 or more stools a day.     ====================== 11/9/22  53 yo female with mod-severe UC for urgent TH visit.  Had 4th Entyvio infusion and .......  6 days ago, it was like she was on no medication. Abd pain subsided but got diarrhea. Lots of mucus. Took immodium. Had a flare likely due to UNDETECTABLE ENTYVIO. Urgency and cramping. Still had the rectal foam and has used it to control urgency and nausea. Today had infusion at  and as day has gone on, she got a sore throat and a runny nose flue like syx. No SOB. Fatigue. No itchiness. Lots of itching leading ups to the UC syx recurring. No flushing, but she felt neck was reddish. No tightness.  No acute rx during the infusion.    ========================= 9/12/22  50 yo female with moderate to severe UC comes in for 2 month f/u. Has had 2 doses of Entyvio, last dose Aug and 3rd dose in 2 days. and is cautiously better. UC is not worsening is how much benefit is she getting from Entyvio vs steroid  Hydrocortisone foam 10% acetate From [employer]. Will try decreasing to qod and d/c if Entyvio really seems to be helping  Rectal bleeding is all gone. Frequency is now 3-4 by day, it is solid stool and smaller size. No pain now. Before foam, had abd pain. Had pancolitis at one time.     ============================ 7/18/22  50 yo female recently completed the etrolizumab study which ended.  Summer was on this study since 2015 and did fabulous.  The study was prematurely ended due to ????efficacy but it was highly efficacious for her.  Last dose of etrolizumab was March-April and she was asyx then.  Syx recurred in June, 1-2 months after last dose.  Had one episode of flare with diarrhea. She has a complete change in BM and the size of stool. More blood. Stool or tissue or mucous.  Little abdominal pain. Is having low back pain. At initial dx, [id number], had syx like this and worse.  Sulfa allergic and dose not tolerate mesalamine. These agents worsen her UC.  No other medical illnesses.  No recent antibiotics and no h/o C diff.  Probiotics do not help. Tumeric in past did not help.  Tried steroid foam that was a temporary fix. Does not think she can wait a month for a study.   Past treatment at dx 2012 included mesalamine that made her worse - sulfa allergy: asacol. Prednisone: effective but with side effects-- fatigue Past Remicade - - anaphylactic rx with third dose - - underdosed.  Humira - no benefit Simponi - no benefit=

## 2025-05-06 ENCOUNTER — OFFICE VISIT (OUTPATIENT)
Dept: URBAN - METROPOLITAN AREA CLINIC 114 | Facility: CLINIC | Age: 55
End: 2025-05-06
Payer: COMMERCIAL

## 2025-05-06 DIAGNOSIS — K51.00 CHRONIC ULCERATIVE ENTEROCOLITIS WITHOUT COMPLICATION: ICD-10-CM

## 2025-05-06 PROCEDURE — 96413 CHEMO IV INFUSION 1 HR: CPT | Performed by: INTERNAL MEDICINE

## 2025-05-06 RX ORDER — VEDOLIZUMAB 300 MG/5ML
AS DIRECTED INJECTION, POWDER, LYOPHILIZED, FOR SOLUTION INTRAVENOUS
Qty: 300 EACH | Refills: 8 | Status: ACTIVE | COMMUNITY

## 2025-05-06 RX ORDER — METHOTREXATE SODIUM 2.5 MG/1
3 TABLETS TABLET ORAL
Qty: 36 TABLETS | Refills: 3 | Status: ACTIVE | COMMUNITY
Start: 2024-03-13

## 2025-05-06 RX ORDER — OMEPRAZOLE 40 MG/1
1 CAPSULE 30 MINUTES BEFORE MORNING MEAL CAPSULE, DELAYED RELEASE ORAL ONCE A DAY
Qty: 90 CAPSULES | Refills: 3 | Status: ACTIVE | COMMUNITY
Start: 2024-03-13

## 2025-07-01 ENCOUNTER — OFFICE VISIT (OUTPATIENT)
Dept: URBAN - METROPOLITAN AREA CLINIC 114 | Facility: CLINIC | Age: 55
End: 2025-07-01
Payer: COMMERCIAL

## 2025-07-01 DIAGNOSIS — K51.00 CHRONIC ULCERATIVE ENTEROCOLITIS WITHOUT COMPLICATION: ICD-10-CM

## 2025-07-01 PROCEDURE — 96413 CHEMO IV INFUSION 1 HR: CPT | Performed by: INTERNAL MEDICINE

## 2025-07-01 RX ORDER — OMEPRAZOLE 40 MG/1
1 CAPSULE 30 MINUTES BEFORE MORNING MEAL CAPSULE, DELAYED RELEASE ORAL ONCE A DAY
Qty: 90 CAPSULES | Refills: 3 | Status: ACTIVE | COMMUNITY
Start: 2024-03-13

## 2025-07-01 RX ORDER — METHOTREXATE SODIUM 2.5 MG/1
3 TABLETS TABLET ORAL
Qty: 36 TABLETS | Refills: 3 | Status: ACTIVE | COMMUNITY
Start: 2024-03-13

## 2025-07-01 RX ORDER — VEDOLIZUMAB 300 MG/5ML
AS DIRECTED INJECTION, POWDER, LYOPHILIZED, FOR SOLUTION INTRAVENOUS
Qty: 300 EACH | Refills: 8 | Status: ACTIVE | COMMUNITY